# Patient Record
Sex: MALE
[De-identification: names, ages, dates, MRNs, and addresses within clinical notes are randomized per-mention and may not be internally consistent; named-entity substitution may affect disease eponyms.]

---

## 2019-10-15 ENCOUNTER — HOSPITAL ENCOUNTER (INPATIENT)
Dept: HOSPITAL 72 - EMR | Age: 44
LOS: 3 days | Discharge: HOME | DRG: 282 | End: 2019-10-18
Payer: COMMERCIAL

## 2019-10-15 VITALS — SYSTOLIC BLOOD PRESSURE: 120 MMHG | DIASTOLIC BLOOD PRESSURE: 68 MMHG

## 2019-10-15 VITALS — SYSTOLIC BLOOD PRESSURE: 122 MMHG | DIASTOLIC BLOOD PRESSURE: 75 MMHG

## 2019-10-15 VITALS — HEIGHT: 70 IN | WEIGHT: 169.04 LBS | BODY MASS INDEX: 24.2 KG/M2

## 2019-10-15 VITALS — DIASTOLIC BLOOD PRESSURE: 78 MMHG | SYSTOLIC BLOOD PRESSURE: 128 MMHG

## 2019-10-15 VITALS — DIASTOLIC BLOOD PRESSURE: 84 MMHG | SYSTOLIC BLOOD PRESSURE: 135 MMHG

## 2019-10-15 VITALS — DIASTOLIC BLOOD PRESSURE: 70 MMHG | SYSTOLIC BLOOD PRESSURE: 124 MMHG

## 2019-10-15 VITALS — SYSTOLIC BLOOD PRESSURE: 128 MMHG | DIASTOLIC BLOOD PRESSURE: 79 MMHG

## 2019-10-15 VITALS — SYSTOLIC BLOOD PRESSURE: 125 MMHG | DIASTOLIC BLOOD PRESSURE: 78 MMHG

## 2019-10-15 DIAGNOSIS — D73.2: ICD-10-CM

## 2019-10-15 DIAGNOSIS — D72.819: ICD-10-CM

## 2019-10-15 DIAGNOSIS — K76.6: ICD-10-CM

## 2019-10-15 DIAGNOSIS — I10: ICD-10-CM

## 2019-10-15 DIAGNOSIS — E11.9: ICD-10-CM

## 2019-10-15 DIAGNOSIS — F10.10: ICD-10-CM

## 2019-10-15 DIAGNOSIS — R16.1: ICD-10-CM

## 2019-10-15 DIAGNOSIS — K85.20: Primary | ICD-10-CM

## 2019-10-15 DIAGNOSIS — E87.6: ICD-10-CM

## 2019-10-15 DIAGNOSIS — D69.59: ICD-10-CM

## 2019-10-15 DIAGNOSIS — Z23: ICD-10-CM

## 2019-10-15 DIAGNOSIS — D50.9: ICD-10-CM

## 2019-10-15 DIAGNOSIS — K70.30: ICD-10-CM

## 2019-10-15 DIAGNOSIS — Z79.84: ICD-10-CM

## 2019-10-15 DIAGNOSIS — Z91.14: ICD-10-CM

## 2019-10-15 DIAGNOSIS — K70.10: ICD-10-CM

## 2019-10-15 DIAGNOSIS — D61.818: ICD-10-CM

## 2019-10-15 LAB
ADD MANUAL DIFF: YES
ALBUMIN SERPL-MCNC: 3.2 G/DL (ref 3.4–5)
ALBUMIN/GLOB SERPL: 0.6 {RATIO} (ref 1–2.7)
ALP SERPL-CCNC: 99 U/L (ref 46–116)
ALT SERPL-CCNC: 53 U/L (ref 12–78)
ANION GAP SERPL CALC-SCNC: 9 MMOL/L (ref 5–15)
APPEARANCE UR: CLEAR
APTT BLD: 28 SEC (ref 23–33)
APTT PPP: (no result) S
AST SERPL-CCNC: 66 U/L (ref 15–37)
BILIRUB SERPL-MCNC: 0.4 MG/DL (ref 0.2–1)
BUN SERPL-MCNC: 9 MG/DL (ref 7–18)
CALCIUM SERPL-MCNC: 8 MG/DL (ref 8.5–10.1)
CHLORIDE SERPL-SCNC: 109 MMOL/L (ref 98–107)
CK SERPL-CCNC: 141 U/L (ref 26–308)
CO2 SERPL-SCNC: 24 MMOL/L (ref 21–32)
CREAT SERPL-MCNC: 0.9 MG/DL (ref 0.55–1.3)
ERYTHROCYTE [DISTWIDTH] IN BLOOD BY AUTOMATED COUNT: 19.1 % (ref 11.6–14.8)
GLOBULIN SER-MCNC: 5.5 G/DL
GLUCOSE UR STRIP-MCNC: NEGATIVE MG/DL
HCT VFR BLD CALC: 30.2 % (ref 42–52)
HGB BLD-MCNC: 8.9 G/DL (ref 14.2–18)
INR PPP: 1.1 (ref 0.9–1.1)
KETONES UR QL STRIP: NEGATIVE
LEUKOCYTE ESTERASE UR QL STRIP: NEGATIVE
MCV RBC AUTO: 78 FL (ref 80–99)
NITRITE UR QL STRIP: NEGATIVE
PH UR STRIP: 5 [PH] (ref 4.5–8)
PLATELET # BLD: 83 K/UL (ref 150–450)
POTASSIUM SERPL-SCNC: 3.7 MMOL/L (ref 3.5–5.1)
PROT UR QL STRIP: (no result)
RBC # BLD AUTO: 3.89 M/UL (ref 4.7–6.1)
SODIUM SERPL-SCNC: 142 MMOL/L (ref 136–145)
SP GR UR STRIP: 1.01 (ref 1–1.03)
UROBILINOGEN UR-MCNC: NORMAL MG/DL (ref 0–1)
WBC # BLD AUTO: 5.3 K/UL (ref 4.8–10.8)

## 2019-10-15 PROCEDURE — 99285 EMERGENCY DEPT VISIT HI MDM: CPT

## 2019-10-15 PROCEDURE — 87340 HEPATITIS B SURFACE AG IA: CPT

## 2019-10-15 PROCEDURE — 82746 ASSAY OF FOLIC ACID SERUM: CPT

## 2019-10-15 PROCEDURE — 82728 ASSAY OF FERRITIN: CPT

## 2019-10-15 PROCEDURE — 36415 COLL VENOUS BLD VENIPUNCTURE: CPT

## 2019-10-15 PROCEDURE — 82550 ASSAY OF CK (CPK): CPT

## 2019-10-15 PROCEDURE — 83550 IRON BINDING TEST: CPT

## 2019-10-15 PROCEDURE — 86803 HEPATITIS C AB TEST: CPT

## 2019-10-15 PROCEDURE — 81001 URINALYSIS AUTO W/SCOPE: CPT

## 2019-10-15 PROCEDURE — 85007 BL SMEAR W/DIFF WBC COUNT: CPT

## 2019-10-15 PROCEDURE — 74177 CT ABD & PELVIS W/CONTRAST: CPT

## 2019-10-15 PROCEDURE — 83880 ASSAY OF NATRIURETIC PEPTIDE: CPT

## 2019-10-15 PROCEDURE — 90689 VACC IIV4 NO PRSRV 0.25ML IM: CPT

## 2019-10-15 PROCEDURE — 86900 BLOOD TYPING SEROLOGIC ABO: CPT

## 2019-10-15 PROCEDURE — 86850 RBC ANTIBODY SCREEN: CPT

## 2019-10-15 PROCEDURE — 72020 X-RAY EXAM OF SPINE 1 VIEW: CPT

## 2019-10-15 PROCEDURE — 85384 FIBRINOGEN ACTIVITY: CPT

## 2019-10-15 PROCEDURE — 84165 PROTEIN E-PHORESIS SERUM: CPT

## 2019-10-15 PROCEDURE — 84550 ASSAY OF BLOOD/URIC ACID: CPT

## 2019-10-15 PROCEDURE — 86901 BLOOD TYPING SEROLOGIC RH(D): CPT

## 2019-10-15 PROCEDURE — 86140 C-REACTIVE PROTEIN: CPT

## 2019-10-15 PROCEDURE — 82248 BILIRUBIN DIRECT: CPT

## 2019-10-15 PROCEDURE — 83036 HEMOGLOBIN GLYCOSYLATED A1C: CPT

## 2019-10-15 PROCEDURE — 82140 ASSAY OF AMMONIA: CPT

## 2019-10-15 PROCEDURE — 96374 THER/PROPH/DIAG INJ IV PUSH: CPT

## 2019-10-15 PROCEDURE — 85730 THROMBOPLASTIN TIME PARTIAL: CPT

## 2019-10-15 PROCEDURE — 83735 ASSAY OF MAGNESIUM: CPT

## 2019-10-15 PROCEDURE — 85610 PROTHROMBIN TIME: CPT

## 2019-10-15 PROCEDURE — 83540 ASSAY OF IRON: CPT

## 2019-10-15 PROCEDURE — 80053 COMPREHEN METABOLIC PANEL: CPT

## 2019-10-15 PROCEDURE — 85025 COMPLETE CBC W/AUTO DIFF WBC: CPT

## 2019-10-15 PROCEDURE — 84100 ASSAY OF PHOSPHORUS: CPT

## 2019-10-15 PROCEDURE — 96361 HYDRATE IV INFUSION ADD-ON: CPT

## 2019-10-15 PROCEDURE — 82962 GLUCOSE BLOOD TEST: CPT

## 2019-10-15 PROCEDURE — 83690 ASSAY OF LIPASE: CPT

## 2019-10-15 PROCEDURE — 86709 HEPATITIS A IGM ANTIBODY: CPT

## 2019-10-15 PROCEDURE — 82607 VITAMIN B-12: CPT

## 2019-10-15 PROCEDURE — 86703 HIV-1/HIV-2 1 RESULT ANTBDY: CPT

## 2019-10-15 PROCEDURE — 96375 TX/PRO/DX INJ NEW DRUG ADDON: CPT

## 2019-10-15 PROCEDURE — 82977 ASSAY OF GGT: CPT

## 2019-10-15 PROCEDURE — 86705 HEP B CORE ANTIBODY IGM: CPT

## 2019-10-15 PROCEDURE — 80307 DRUG TEST PRSMV CHEM ANLYZR: CPT

## 2019-10-15 RX ADMIN — SODIUM CHLORIDE AND POTASSIUM CHLORIDE SCH MLS/HR: 9; 1.49 INJECTION, SOLUTION INTRAVENOUS at 23:12

## 2019-10-15 RX ADMIN — FOLIC ACID SCH MLS/HR: 5 INJECTION, SOLUTION INTRAMUSCULAR; INTRAVENOUS; SUBCUTANEOUS at 22:29

## 2019-10-15 RX ADMIN — DEXTROSE AND SODIUM CHLORIDE SCH MLS/HR: 5; .45 INJECTION, SOLUTION INTRAVENOUS at 21:15

## 2019-10-15 NOTE — GENERAL PROGRESS NOTE
Assessment/Plan


Assessment/Plan:


GI CONSULT


Dictated.  Thank you.


Willie Riley MD





Subjective


Allergies:  


Coded Allergies:  


     No Known Allergies (Unverified , 10/15/19)





Objective





Last 24 Hour Vital Signs








  Date Time  Temp Pulse Resp B/P (MAP) Pulse Ox O2 Delivery O2 Flow Rate FiO2


 


10/15/19 18:58 98.0 98 18 131/84 95 Room Air  


 


10/15/19 18:01 98.2 95 18 128/79 95 Room Air  


 


10/15/19 16:54 97.6 98 15 135/84 95 Room Air  


 


10/15/19 16:02 98.0       


 


10/15/19 15:15 98.2 100 18 128/78 95 Room Air  


 


10/15/19 14:50 98.8 95 16 120/68 95 Room Air  


 


10/15/19 13:00 98.2 100 20 122/75 95 Room Air  


 


10/15/19 12:02 98.4 101 18 125/78 95 Room Air  


 


10/15/19 12:02 98.4 111 18 125/78 (94) 95 Room Air  








Laboratory Tests


10/15/19 12:30: 


White Blood Count 5.3, Red Blood Count 3.89L, Hemoglobin 8.9L, Hematocrit 30.2L

, Mean Corpuscular Volume 78L, Mean Corpuscular Hemoglobin 22.9L, Mean 

Corpuscular Hemoglobin Concent 29.5L, Red Cell Distribution Width 19.1H, 

Platelet Count 83L, Mean Platelet Volume 8.9, Neutrophils (%) (Auto) , 

Lymphocytes (%) (Auto) , Monocytes (%) (Auto) , Eosinophils (%) (Auto) , 

Basophils (%) (Auto) , Differential Total Cells Counted 100, Neutrophils % (

Manual) 45, Lymphocytes % (Manual) 48H, Monocytes % (Manual) 4, Eosinophils % (

Manual) 3, Basophils % (Manual) 0, Band Neutrophils 0, Platelet Estimate 

DecreasedL, Platelet Morphology , Clumped Platelets 1+, Hypochromasia 1+, 

Anisocytosis 1+, Microcytosis 1+, Prothrombin Time 11.2, Prothromb Time 

International Ratio 1.1, Activated Partial Thromboplast Time 28, Urine Color 

Pale yellow, Urine Appearance Clear, Urine pH 5, Urine Specific Gravity 1.015, 

Urine Protein 2+H, Urine Glucose (UA) Negative, Urine Ketones Negative, Urine 

Blood 1+H, Urine Nitrite Negative, Urine Bilirubin Negative, Urine Urobilinogen 

Normal, Urine Leukocyte Esterase Negative, Urine RBC 0-2H, Urine WBC 0, Urine 

Squamous Epithelial Cells Occasional, Urine Bacteria Occasional, Sodium Level 

142, Potassium Level 3.7, Chloride Level 109H, Carbon Dioxide Level 24, Anion 

Gap 9, Blood Urea Nitrogen 9, Creatinine 0.9, Estimat Glomerular Filtration 

Rate > 60, Glucose Level 119H, Calcium Level 8.0L, Total Bilirubin 0.4, 

Aspartate Amino Transf (AST/SGOT) 66H, Alanine Aminotransferase (ALT/SGPT) 53, 

Alkaline Phosphatase 99, Total Creatine Kinase 141, Total Protein 8.7H, Albumin 

3.2L, Globulin 5.5, Albumin/Globulin Ratio 0.6L, Lipase 623H


Height (Feet):  5


Height (Inches):  10.00


Weight (Pounds):  170











Willie Riley MD Oct 15, 2019 20:26

## 2019-10-15 NOTE — EMERGENCY ROOM REPORT
History of Present Illness


General


Chief Complaint:  Back Pain-No Injury





Present Illness


HPI


44-year-old male with a history of type 2 diabetes and hypertension currently 

not controlled as patient does not take his medication on daily basis, brought 

in by paramedics after being on the street and complaining of severe low back 

pain as well as bilateral leg numbness.  Patient reports that he has been 

feeling that his legs are giving up on him and feeling numbness for several 

months.  Patient supposed to be taking metformin for an unknown blood pressure 

medication who reports noncompliant with.  Also reports that he drinks alcohol 

on a daily basis.  Patient denies any fall or injury, denies urinary frequency 

and pain however reports of blood in his urine.  Denies flank pain, saddle 

paresthesia, tingling and numbness radiating from lower back to the legs.  

Patient rating the pain in his lumbar region and 7 out of 10 and left-sided 

ribs 5 out of 10 without shortness of breath.  Denies chest pain, blurry vision

, headache, dizziness, abdominal pain, nausea vomiting.  Has not taken 

medication to alleviate his symptoms.  Patient is stable and has stable vital 

signs.  As of trauma noted.


 (Maximilian Nolan)


Allergies:  


Coded Allergies:  


     No Known Allergies (Unverified , 10/15/19)





Patient History


Past Medical History:  see triage record


Past Surgical History:  unable to obtain


Pertinent Family History:  none


Social History:  Reports: alcohol use - daily


Immunizations:  UTD


Reviewed Nursing Documentation:  PMH: Agreed; PSxH: Agreed (Maximilian Nolan)





Nursing Documentation-PMH


Hx Cardiac Problems:  No - anemia


Hx Hypertension:  Yes


Hx Diabetes:  Yes


 (Maximilian Nolan)





Review of Systems


All Other Systems:  negative except mentioned in HPI


 (Maximilian Nolan)





Physical Exam





Vital Signs








  Date Time  Temp Pulse Resp B/P (MAP) Pulse Ox O2 Delivery O2 Flow Rate FiO2


 


10/15/19 12:02 98.4 111 18 125/78 (94) 95 Room Air  








Sp02 EP Interpretation:  reviewed, normal


General Appearance:  no apparent distress, alert, GCS 15, non-toxic


Head:  normocephalic, atraumatic


Eyes:  bilateral eye normal inspection, bilateral eye PERRL


ENT:  hearing grossly normal, normal pharynx, no angioedema, normal voice


Neck:  full range of motion, supple/symm/no masses


Respiratory:  chest non-tender, lungs clear, normal breath sounds, no rhonchi, 

no respiratory distress, no wheezing, speaking full sentences


Cardiovascular #1:  normal inspection, normal peripheral pulses, regular rate, 

rhythm, no murmur, normal capillary refill


Cardiovascular #2:  2+ dorsalis pedis (R), 2+ dorsalis pedis (L)


Gastrointestinal:  normal bowel sounds, non tender, soft, non-distended, no 

guarding, no rebound


Rectal:  deferred


Musculoskeletal:  back normal, gait/station normal, normal range of motion, non-

tender, no calf tenderness, pelvis stable


Neurologic:  alert, oriented x3, responsive, motor strength/tone normal, 

sensory intact, speech normal


Psychiatric:  judgement/insight normal, memory normal, mood/affect normal, no 

suicidal/homicidal ideation


Skin:  no rash, normal color, other - No ecchymosis noted


Lymphatic:  no adenopathy


 (Maximilian Nolan)





Medical Decision Making


PA Attestation


All my diagnosis and treatment plans were reviewed ad discussed with my 

supervising physician Dr. Villarreal


 (Maximilian Nolan)


PA Attestation


I have overseen and manage the care of this patient along with AN Roman.  Briefly, this is a 44-year-old male with a history of 

noncompliance for hypertension and diabetes presenting today with back pain and 

abdominal pain.  He was found to have pancreatitis from an elevated lipase but 

also findings on CT suggestive of cirrhosis which he says is a new diagnosis 

with him, portal hypertension, splenomegaly.  He has microcytic anemia and 

thrombocytopenia concerning for splenic sequestration as well as a granuloma in 

the right middle lobe.  He has persistent pain and is receiving IV fluids and 

pain medication.  He will be admitted for further work-up and management.


 (Srinath Martinez MD)


Diagnostic Impression:  


 Primary Impression:  


 Spleen enlargement


 Additional Impressions:  


 Liver cirrhosis


 Pancreatitis


ER Course


44-year-old male with a history of type 2 diabetes and hypertension currently 

not controlled as patient does not take his medication on daily basis, brought 

in by paramedics after being on the street and complaining of severe low back 

pain as well as bilateral leg numbness.  Patient reports that he has been 

feeling that his legs are giving up on him and feeling numbness for several 

months.  Patient supposed to be taking metformin for an unknown blood pressure 

medication who reports noncompliant with.  Also reports that he drinks alcohol 

on a daily basis.  Patient denies any fall or injury, denies urinary frequency 

and pain however reports of blood in his urine.  Denies flank pain, saddle 

paresthesia, tingling and numbness radiating from lower back to the legs.  

Patient rating the pain in his lumbar region and 7 out of 10 and left-sided 

ribs 5 out of 10 without shortness of breath.  Denies chest pain, blurry vision

, headache, dizziness, abdominal pain, nausea vomiting.  Has not taken 

medication to alleviate his symptoms.  Patient is stable and has stable vital 

signs.  As of trauma noted.





Ddx considered but are not limited to: Splenomegaly, pink otitis, liver 

cirrhosis, cholecystitis, lumbar spine fracture versus contusion











Vital signs: are WNL, pt. is afebrile








 H&PE are most consistent with: Splenomegaly, pancreatitis, liver cirrhosis














ORDERS: Lumbar spine x-ray, chest x-ray with rib series on the left side, 

abdominal CT scan with contrast, abdominal pain order





ER intervention: Morphine, NS bolus, Zofran, Pepcid








Patient was admitted with diagnosis of    splenomegaly, liver cirrhosis, 

hepatitis   to         under supervision of .: Sheila








pt stable at time of admission 


 (Maximilian Nolan)





Chest X-Ray Diagnostic Results


Chest X-Ray Diagnostic Results :  


   Chest X-Ray Ordered:  Yes


   # of Views/Limited/Complete:  1 View


   Indication:  Other


   EP Interpretation:  Yes


   PA Xray:  Interpretation reviewed, by supervising MD, and agrees with 

findings.


   Interpretation:  no consolidation, no effusion, no pneumothorax


   Impression:  No acute disease


   Electronically Signed by:  Maximilian Herrera PA-C


 (Maximilian Nolan)





Other X-Ray Diagnostic Results


Other X-Ray Diagnostic Results #1:  


   X-Ray ordered:  L spine


   # of Views/Limited Vs Complete:  3 View


   Indication:  Pain


   EP Interpretation:  Yes


   PA Xray:  Interpretation reviewed, by supervising MD, and agrees with 

findings.


   Interpretation:  no dislocation, no soft tissue swelling, no fractures


   Impression:  No acute disease


   Electronically Signed by:  Maximilian Herrera PA-C


Other X-Ray Diagnostic Results #2:  


   X-Ray ordered:  ribs left side


   # of Views/Limited Vs Complete:  3 View


   Indication:  Pain


   EP Interpretation:  Yes


   PA Xray:  Interpretation reviewed, by supervising MD, and agrees with 

findings.


   Interpretation:  no dislocation, no soft tissue swelling, no fractures


   Impression:  No acute disease


   Electronically Signed by:  Maximilian Herrera PA-C


 (Maximilian Nolan)





CT/MRI/US Diagnostic Results


CT/MRI/US Diagnostic Results :  


   Imaging Test Ordered:  CT abdomen pelvis with contrast


   Impression


Enlarged spleen, liver cirrhosis


 (Maximilian Nolan)





Last Vital Signs








  Date Time  Temp Pulse Resp B/P (MAP) Pulse Ox O2 Delivery O2 Flow Rate FiO2


 


10/15/19 12:02 98.4 101 18 125/78 95 Room Air  








 (Maximilian Nolan)


Disposition:  ADMITTED AS INPATIENT


Condition:  Serious











Maximilian Nolan Oct 15, 2019 13:06


Srinath Martinez MD Oct 15, 2019 17:57

## 2019-10-15 NOTE — DIAGNOSTIC IMAGING REPORT
Indication: Lumbar spine pain

 

Technique: 3 views of the lumbar spine

 

Comparison: None

 

Findings: Bony alignment is normal. Vertebral body heights are preserved. There is

degenerative disc narrowing at L3-4 and L4-5. The remaining disc spaces are

preserved. No acute fractures. No dislocations. The pedicles are intact. Sacral

arches are preserved. Sacroiliac joint spaces are preserved. The surrounding soft

tissues are unremarkable

 

Impression: Degenerative changes as described

 

No acute bony trauma

## 2019-10-15 NOTE — NUR
ED Nurse Note:



pt went down for x-ray by  in stable condition. pt was able to ambulate from 
the bed to .

## 2019-10-15 NOTE — NUR
ED Nurse Note:



pt brought in to ER from street by ambulance due to severe back pain 10/10. per 
pt, he has had chronic back pain but today the pain got unbearable. pt lives 
with parents. pt aao x4 and unable to ambulate at this moment due to severe 
pain. calm and cooperative. skin dry but no visible wound noted at this time. 
no acute distress noted at this time.

## 2019-10-15 NOTE — DIAGNOSTIC IMAGING REPORT
Clinical Indication: Severe low back pain, abdominal pain

 

Technique:   No oral contrast utilized, per emergency room physician request  IV

administration nonionic contrast. Venous phase spiral acquisition obtained through

the abdomen and pelvis. Multiplanar reconstructions were generated. Total dose length

product 4327 mGycm. CTDIvol(s) 49 x 2 mGy. Dose reduction achieved using automated

exposure control

 

 

Comparison: none

 

Findings: Lack of enteric contrast limits assessment of the GI tract. The appendix is

normal. There is no evidence of diverticulosis or diverticulitis. The distal ileum is

somewhat prominent in caliber, but no charlene small bowel distention. No free or

loculated intraperitoneal gas or fluid is evident. The distal esophagus, stomach,

duodenum are unremarkable.

 

The liver demonstrates surface nodularity, consistent with cirrhosis. Subcentimeter

low-attenuation lesions are seen in the inferior right hepatic lobe, segment 6. The

gallbladder is nondistended, unremarkable. No biliary ductal dilatation. The pancreas

is unremarkable. The spleen is enlarged, measuring 15 cm long axis dimension. No

focal splenic abnormality. Small perigastric, periesophageal, and splenic hilar

varices are demonstrated. The adrenals are unremarkable. The kidneys are

unremarkable. No renal or ureteral calculi. No focal renal parenchymal abnormality.

No hydronephrosis or hydroureter. There is mild bladder wall thickening. No pelvic

mass or adenopathy. The prostate is prominent.

 

The included lung bases demonstrate posterior dependent atelectatic changes, are

otherwise clear. The bones demonstrate mild degenerative spondylosis changes.

 

Impression: Limited assessment of the GI tract, due to lack of enteric contrast

administration

 

Mildly prominent distal ileum, doubtful significance although the possibility of

enteritis should be considered

 

Mild bladder wall thickening, could indicate cystitis

 

No definite acute abnormality otherwise

 

Evidence of hepatic cirrhosis, with hepatic surface nodularity

 

Evidence of portal hypertension, with splenomegaly and small varices

 

Prominent prostate

 

Incidental findings as noted, including dependent posterior pulmonary atelectatic

changes, mild degenerative spondylosis

 

The CT scanner at [facility] is accredited by the [employer]

[employer] and the scans are performed using protocols designed to limit radiation

exposure to as low as reasonably achievable to attain images of sufficient resolution

adequate for diagnostic evaluation.

## 2019-10-15 NOTE — NUR
TRANSFER TO FLOOR:

Patient transferred to  as ordered, per MD Gayle.   Report given to RADHA Sands.  Belongings and medications given to patient. Family and or S/O informed of 
transfer.

## 2019-10-15 NOTE — NUR
NURSE NOTES:

Admitted a 44 year old, alert and oriented x4, with complaint of 5/10. IV access on the 
right antecubital area. Obtained admission orders from Dr. Araujo. Pt was seen by Dr. Riley, ordered to put patient on clear liquids for now. Oriented to room. Instructed the 
use of call light. Call light in reach, bed in lowest. Will continue to monitor.

## 2019-10-15 NOTE — DIAGNOSTIC IMAGING REPORT
Indication: Reason For Exam: PAIN

 

Technique: One view of the chest, 2 views of the left ribs

 

Comparison: none

 

Findings: Chest demonstrates clear lungs, no infiltrates, effusions, or congestion.

The heart size is upper limits of normal. No evidence of acute rib fracture. No

pneumothorax. There is a calcified granuloma in the right midlung

 

Impression: No acute process

 

Evidence of old granulomatous disease

## 2019-10-16 VITALS — DIASTOLIC BLOOD PRESSURE: 77 MMHG | SYSTOLIC BLOOD PRESSURE: 140 MMHG

## 2019-10-16 VITALS — SYSTOLIC BLOOD PRESSURE: 135 MMHG | DIASTOLIC BLOOD PRESSURE: 80 MMHG

## 2019-10-16 VITALS — DIASTOLIC BLOOD PRESSURE: 69 MMHG | SYSTOLIC BLOOD PRESSURE: 121 MMHG

## 2019-10-16 VITALS — SYSTOLIC BLOOD PRESSURE: 127 MMHG | DIASTOLIC BLOOD PRESSURE: 76 MMHG

## 2019-10-16 VITALS — DIASTOLIC BLOOD PRESSURE: 90 MMHG | SYSTOLIC BLOOD PRESSURE: 122 MMHG

## 2019-10-16 VITALS — DIASTOLIC BLOOD PRESSURE: 81 MMHG | SYSTOLIC BLOOD PRESSURE: 144 MMHG

## 2019-10-16 LAB
ADD MANUAL DIFF: YES
ALBUMIN SERPL-MCNC: 3.1 G/DL (ref 3.4–5)
ALBUMIN/GLOB SERPL: 0.6 {RATIO} (ref 1–2.7)
ALP SERPL-CCNC: 99 U/L (ref 46–116)
ALT SERPL-CCNC: 45 U/L (ref 12–78)
ANION GAP SERPL CALC-SCNC: 6 MMOL/L (ref 5–15)
AST SERPL-CCNC: 54 U/L (ref 15–37)
BILIRUB SERPL-MCNC: 0.6 MG/DL (ref 0.2–1)
BUN SERPL-MCNC: 7 MG/DL (ref 7–18)
CALCIUM SERPL-MCNC: 8.2 MG/DL (ref 8.5–10.1)
CHLORIDE SERPL-SCNC: 104 MMOL/L (ref 98–107)
CO2 SERPL-SCNC: 28 MMOL/L (ref 21–32)
CREAT SERPL-MCNC: 0.7 MG/DL (ref 0.55–1.3)
ERYTHROCYTE [DISTWIDTH] IN BLOOD BY AUTOMATED COUNT: 19 % (ref 11.6–14.8)
GLOBULIN SER-MCNC: 5.4 G/DL
HCT VFR BLD CALC: 27.7 % (ref 42–52)
HGB BLD-MCNC: 8.3 G/DL (ref 14.2–18)
INR PPP: 1.1 (ref 0.9–1.1)
MCV RBC AUTO: 76 FL (ref 80–99)
PLATELET # BLD: 26 K/UL (ref 150–450)
POTASSIUM SERPL-SCNC: 3.3 MMOL/L (ref 3.5–5.1)
RBC # BLD AUTO: 3.63 M/UL (ref 4.7–6.1)
SODIUM SERPL-SCNC: 138 MMOL/L (ref 136–145)
WBC # BLD AUTO: 3.7 K/UL (ref 4.8–10.8)

## 2019-10-16 PROCEDURE — 30233R1 TRANSFUSION OF NONAUTOLOGOUS PLATELETS INTO PERIPHERAL VEIN, PERCUTANEOUS APPROACH: ICD-10-PCS

## 2019-10-16 RX ADMIN — DEXTROSE AND SODIUM CHLORIDE SCH MLS/HR: 5; .45 INJECTION, SOLUTION INTRAVENOUS at 07:15

## 2019-10-16 RX ADMIN — FOLIC ACID SCH MLS/HR: 5 INJECTION, SOLUTION INTRAMUSCULAR; INTRAVENOUS; SUBCUTANEOUS at 23:37

## 2019-10-16 RX ADMIN — FOLIC ACID SCH MLS/HR: 5 INJECTION, SOLUTION INTRAMUSCULAR; INTRAVENOUS; SUBCUTANEOUS at 23:19

## 2019-10-16 RX ADMIN — SODIUM CHLORIDE AND POTASSIUM CHLORIDE SCH MLS/HR: 9; 1.49 INJECTION, SOLUTION INTRAVENOUS at 23:11

## 2019-10-16 RX ADMIN — DEXTROSE MONOHYDRATE, SODIUM CHLORIDE, AND POTASSIUM CHLORIDE SCH MLS/HR: 50; 9; 2.98 INJECTION, SOLUTION INTRAVENOUS at 19:02

## 2019-10-16 RX ADMIN — SODIUM CHLORIDE AND POTASSIUM CHLORIDE SCH MLS/HR: 9; 1.49 INJECTION, SOLUTION INTRAVENOUS at 23:37

## 2019-10-16 NOTE — NUR
NURSE NOTES:

RECEIVED PT FROM RADHA LOPES. PT IS AWAKE, AAO X4, ON ROOM AIR, NO ACUTE DISTRESS NOTED. IV 
ON LEFT AC IS INTACT AND PATENT. PT DENIES PAIN AT THE MOMENT. BED IS LOCKED AND LOW, BED 
ALARMS ACTIVE, SIDE RAILS UP X2 AND CALL LIGHT IS WITHIN REACH. WILL CONTINUE TO MONITOR.

## 2019-10-16 NOTE — NUR
NURSE NOTES:

Pt signed the platelet transfusion consent form. Platelet transfusion information given to 
the pt. Pt is aware of possible adverse reactions. Given Tylenol and Benadryl x 1 prior to 
platelet transfusion as per Dr. Bryant. VS obtained. /80 MD 67 O2 Sat 99% RR 16 
Temp 98.1. Started platelet, 2 RNs verified it. RN at bedside will continue to monitor.

## 2019-10-16 NOTE — NUR
NURSE NOTES:

Patient is tolerated with his regular diet, denies nausea or vomiting, no pain. Patient is 
aware of urine collection for drug screen.

## 2019-10-16 NOTE — NUR
NURSE NOTES:

RECEIVED PT FROM RADHA GALLEGOS. PT IS AWAKE, AAO X4, ON ROOM AIR, NO ACUTE DISTRESS NOTED. IV ON 
LEFT AC IS INTACT AND PATENT. PT DENIES PAIN AND SOB. BED IS LOCKED AND LOW, BED ALARMS 
ACTIVE, SIDE RAILS UP X2 AND CALL LIGHT IS WITHIN REACH. WILL CONTINUE TO MONITOR

## 2019-10-16 NOTE — NUR
NURSE NOTES:

RN spoke to Dr. Bryant regarding patient's WBC of 3.7  and PLT of 26,000 today dropped 
from 83,000 since 10/15/19. No episodes of active bleeding now but patient stated that he 
had dar colored urine before. RN received order from Dr. Bryant to check INR and 
Fibrinogen and give 1 unit of platelet, [facility] made aware and RN was told platelet will be 
ordered to [facility] so it will take 4-5 hours to receive  and patient agreed with 
platelet transfusion. Transfusion information given to the patient.

## 2019-10-16 NOTE — NUR
NURSE NOTES:

Patient finished platelet transfusion without adverse reaction. v/s obtained BP- 132/83, 
pulse is 74 and temp is 99.0. O2 sat is 98%, denies any back pain or SOB. Endorse to next 
shift to continue to monitor. RN returned paper and empty bag to [facility]. Urine specimen was 
collected and sent it to lab.

## 2019-10-16 NOTE — GENERAL PROGRESS NOTE
Assessment/Plan


Assessment/Plan:


Assessment


- alcohol abuse


- alcoholic liver disease


- alcoholic pancreatitis


- Anemia / thrombocytopenia





Recommendations


- d/c EtOH


- follow labs


- po solids 


- outpatient colonoscopy advised to pt, once improved





Subjective


Allergies:  


Coded Allergies:  


     No Known Allergies (Unverified , 10/15/19)


Subjective


Better


tolerating PO


no abd pain





Objective





Last 24 Hour Vital Signs








  Date Time  Temp Pulse Resp B/P (MAP) Pulse Ox O2 Delivery O2 Flow Rate FiO2


 


10/16/19 20:00 98.4 70 16 127/76 (93) 97   


 


10/16/19 16:00 98.5 71 18 122/90 (101) 97   


 


10/16/19 12:00 98.4 80 20 135/80 (98) 97   


 


10/16/19 09:00      Room Air  


 


10/16/19 08:00 98.6 78 18 140/77 (98) 96   


 


10/16/19 04:00 98.1 73 17 144/81 (102) 98   


 


10/16/19 00:00 98.6 90 18 121/69 (86) 95   


 


10/15/19 23:09      Room Air  

















Intake and Output  


 


 10/15/19 10/16/19





 19:00 07:00


 


Intake Total 3000 ml 986 ml


 


Balance 3000 ml 986 ml


 


  


 


Intake Oral 0 ml 480 ml


 


IV Total 3000 ml 506 ml


 


# Voids  3








Laboratory Tests


10/16/19 05:53: 


White Blood Count 3.7L, Red Blood Count 3.63L, Hemoglobin 8.3L, Hematocrit 27.7L

, Mean Corpuscular Volume 76L, Mean Corpuscular Hemoglobin 23.0L, Mean 

Corpuscular Hemoglobin Concent 30.1L, Red Cell Distribution Width 19.0H, 

Platelet Count 26#L, Mean Platelet Volume 8.5, Neutrophils (%) (Auto) , 

Lymphocytes (%) (Auto) , Monocytes (%) (Auto) , Eosinophils (%) (Auto) , 

Basophils (%) (Auto) , Differential Total Cells Counted 100, Neutrophils % (

Manual) 54, Lymphocytes % (Manual) 34, Monocytes % (Manual) 10, Eosinophils % (

Manual) 2, Basophils % (Manual) 0, Band Neutrophils 0, Platelet Estimate 

DecreasedL, Platelet Morphology Normal, Hypochromasia 2+, Anisocytosis 2+, 

Microcytosis 1+, Sodium Level 138, Potassium Level 3.3L, Chloride Level 104, 

Carbon Dioxide Level 28, Anion Gap 6, Blood Urea Nitrogen 7, Creatinine 0.7, 

Estimat Glomerular Filtration Rate > 60, Glucose Level 86, Calcium Level 8.2L, 

Total Bilirubin 0.6, Aspartate Amino Transf (AST/SGOT) 54H, Alanine 

Aminotransferase (ALT/SGPT) 45, Alkaline Phosphatase 99, Total Protein 8.5H, 

Total Protein (PEP) [Pending], Albumin 3.1L, Albumin (PEP) [Pending], Globulin 

5.4, Globulin (PEP) [Pending], Albumin/Globulin Ratio [Pending], Alpha-1-

Globulins [Pending], Alpha-2-Globulins [Pending], Beta Globulins [Pending], 

Beta Gamma Globulin [Pending], PEP Abnormal Protein Bands [Pending], Protein 

Electrophoresis Interpret [Pending], Lipase 552H, Hepatitis A IgM Antibody [

Pending], Hepatitis B Surface Antigen [Pending], Hepatitis B Core IgM Antibody [

Pending], Hepatitis C Antibody [Pending], HIV (1&2) Antibody Rapid Negative


10/16/19 09:00: 


Prothrombin Time 11.6H, Prothromb Time International Ratio 1.1, Fibrinogen 213


10/16/19 19:10: 


Urine Opiates Screen Negative, Urine Barbiturates Screen Negative, 

Phencyclidine (PCP) Screen Negative, Urine Amphetamines Screen Negative, Urine 

Benzodiazepines Screen Negative, Urine Cocaine Screen Negative, Urine Marijuana 

(THC) Screen Negative


Height (Feet):  5


Height (Inches):  10.00


Weight (Pounds):  169


Objective


WDWN


NCAT


supple


CTA


RR


Abd soft NT ND


no edema


non focal











Willie Riley MD Oct 16, 2019 21:28

## 2019-10-16 NOTE — NUR
NURSE NOTES:

rechecked v/s BP- 132/75, pulse 67, temp 98.1 and o2sat is 98%, no adverse reaction @ this 
time. Will continue to monitor.

## 2019-10-16 NOTE — CONSULTATION
History of Present Illness


General


Chief Complaint:  Back Pain-No Injury





Present Illness


Allergies:  


Coded Allergies:  


     No Known Allergies (Unverified , 10/15/19)





Medication History


Miscellaneous Medications


Metformin Hcl (Metformin Hcl), Unknown Dose MC, (Reported)





Patient History


Healthcare decision maker





Resuscitation status


Full Code


Advanced Directive on File








Physical Exam





Last 24 Hour Vital Signs








  Date Time  Temp Pulse Resp B/P (MAP) Pulse Ox O2 Delivery O2 Flow Rate FiO2


 


10/16/19 04:00 98.1 73 17 144/81 (102) 98   


 


10/16/19 00:00 98.6 90 18 121/69 (86) 95   


 


10/15/19 23:09      Room Air  


 


10/15/19 20:00 98.5 92 19 124/70 (88) 95   


 


10/15/19 18:58 98.0 98 18 131/84 95 Room Air  


 


10/15/19 18:01 98.2 95 18 128/79 95 Room Air  


 


10/15/19 16:54 97.6 98 15 135/84 95 Room Air  


 


10/15/19 16:02 98.0       


 


10/15/19 15:15 98.2 100 18 128/78 95 Room Air  


 


10/15/19 14:50 98.8 95 16 120/68 95 Room Air  


 


10/15/19 13:00 98.2 100 20 122/75 95 Room Air  


 


10/15/19 12:02 98.4 101 18 125/78 95 Room Air  


 


10/15/19 12:02 98.4 111 18 125/78 (94) 95 Room Air  

















Intake and Output  


 


 10/15/19 10/16/19





 18:59 06:59


 


Intake Total 3000 ml 911 ml


 


Balance 3000 ml 911 ml


 


  


 


Intake Oral 0 ml 480 ml


 


IV Total 3000 ml 431 ml


 


# Voids  3











Laboratory Tests








Test


  10/15/19


12:30 10/16/19


05:53 10/16/19


09:00


 


White Blood Count


  5.3 K/UL


(4.8-10.8) 3.7 K/UL


(4.8-10.8)  L 


 


 


Red Blood Count


  3.89 M/UL


(4.70-6.10)  L 3.63 M/UL


(4.70-6.10)  L 


 


 


Hemoglobin


  8.9 G/DL


(14.2-18.0)  L 8.3 G/DL


(14.2-18.0)  L 


 


 


Hematocrit


  30.2 %


(42.0-52.0)  L 27.7 %


(42.0-52.0)  L 


 


 


Mean Corpuscular Volume


  78 FL (80-99)


L 76 FL (80-99)


L 


 


 


Mean Corpuscular Hemoglobin


  22.9 PG


(27.0-31.0)  L 23.0 PG


(27.0-31.0)  L 


 


 


Mean Corpuscular Hemoglobin


Concent 29.5 G/DL


(32.0-36.0)  L 30.1 G/DL


(32.0-36.0)  L 


 


 


Red Cell Distribution Width


  19.1 %


(11.6-14.8)  H 19.0 %


(11.6-14.8)  H 


 


 


Platelet Count


  83 K/UL


(150-450)  L 26 K/UL


(150-450)  #L 


 


 


Mean Platelet Volume


  8.9 FL


(6.5-10.1) 8.5 FL


(6.5-10.1) 


 


 


Neutrophils (%) (Auto)


  % (45.0-75.0)


  % (45.0-75.0)


  


 


 


Lymphocytes (%) (Auto)


  % (20.0-45.0)


  % (20.0-45.0)


  


 


 


Monocytes (%) (Auto)  % (1.0-10.0)    % (1.0-10.0)   


 


Eosinophils (%) (Auto)  % (0.0-3.0)    % (0.0-3.0)   


 


Basophils (%) (Auto)  % (0.0-2.0)    % (0.0-2.0)   


 


Differential Total Cells


Counted 100  


  


  


 


 


Neutrophils % (Manual) 45 % (45-75)   Pending   


 


Lymphocytes % (Manual) 48 % (20-45)  H Pending   


 


Monocytes % (Manual) 4 % (1-10)    


 


Eosinophils % (Manual) 3 % (0-3)    


 


Basophils % (Manual) 0 % (0-2)    


 


Band Neutrophils 0 % (0-8)    


 


Platelet Estimate Decreased  L Pending   


 


Platelet Morphology    Pending   


 


Clumped Platelets 1+    


 


Hypochromasia 1+    


 


Anisocytosis 1+    


 


Microcytosis 1+    


 


Prothrombin Time


  11.2 SEC


(9.30-11.50) 


  11.6 SEC


(9.30-11.50)  H


 


Prothromb Time International


Ratio 1.1 (0.9-1.1)  


  


  1.1 (0.9-1.1)  


 


 


Activated Partial


Thromboplast Time 28 SEC (23-33)


  


  


 


 


Urine Color Pale yellow    


 


Urine Appearance Clear    


 


Urine pH 5 (4.5-8.0)    


 


Urine Specific Gravity


  1.015


(1.005-1.035) 


  


 


 


Urine Protein


  2+ (NEGATIVE)


H 


  


 


 


Urine Glucose (UA)


  Negative


(NEGATIVE) 


  


 


 


Urine Ketones


  Negative


(NEGATIVE) 


  


 


 


Urine Blood


  1+ (NEGATIVE)


H 


  


 


 


Urine Nitrite


  Negative


(NEGATIVE) 


  


 


 


Urine Bilirubin


  Negative


(NEGATIVE) 


  


 


 


Urine Urobilinogen


  Normal MG/DL


(0.0-1.0) 


  


 


 


Urine Leukocyte Esterase


  Negative


(NEGATIVE) 


  


 


 


Urine RBC


  0-2 /HPF (0 -


0)  H 


  


 


 


Urine WBC


  0 /HPF (0 - 0)


  


  


 


 


Urine Squamous Epithelial


Cells Occasional


/LPF 


  


 


 


Urine Bacteria


  Occasional


/HPF (NONE) 


  


 


 


Sodium Level


  142 MMOL/L


(136-145) 138 MMOL/L


(136-145) 


 


 


Potassium Level


  3.7 MMOL/L


(3.5-5.1) 3.3 MMOL/L


(3.5-5.1)  L 


 


 


Chloride Level


  109 MMOL/L


()  H 104 MMOL/L


() 


 


 


Carbon Dioxide Level


  24 MMOL/L


(21-32) 28 MMOL/L


(21-32) 


 


 


Anion Gap


  9 mmol/L


(5-15) 6 mmol/L


(5-15) 


 


 


Blood Urea Nitrogen


  9 mg/dL (7-18)


  7 mg/dL (7-18)


  


 


 


Creatinine


  0.9 MG/DL


(0.55-1.30) 0.7 MG/DL


(0.55-1.30) 


 


 


Estimat Glomerular Filtration


Rate > 60 mL/min


(>60) > 60 mL/min


(>60) 


 


 


Glucose Level


  119 MG/DL


()  H 86 MG/DL


() 


 


 


Calcium Level


  8.0 MG/DL


(8.5-10.1)  L 8.2 MG/DL


(8.5-10.1)  L 


 


 


Total Bilirubin


  0.4 MG/DL


(0.2-1.0) 0.6 MG/DL


(0.2-1.0) 


 


 


Aspartate Amino Transf


(AST/SGOT) 66 U/L (15-37)


H 54 U/L (15-37)


H 


 


 


Alanine Aminotransferase


(ALT/SGPT) 53 U/L (12-78)


  45 U/L (12-78)


  


 


 


Alkaline Phosphatase


  99 U/L


() 99 U/L


() 


 


 


Total Creatine Kinase


  141 U/L


() 


  


 


 


Total Protein


  8.7 G/DL


(6.4-8.2)  H 8.5 G/DL


(6.4-8.2)  H 


 


 


Albumin


  3.2 G/DL


(3.4-5.0)  L 3.1 G/DL


(3.4-5.0)  L 


 


 


Globulin 5.5 g/dL   5.4 g/dL   


 


Albumin/Globulin Ratio


  0.6 (1.0-2.7)


L 0.6 (1.0-2.7)


L 


 


 


Lipase


  623 U/L


()  H 552 U/L


()  H 


 


 


Hepatitis A IgM Antibody  Pending   


 


Hepatitis B Surface Antigen  Pending   


 


Hepatitis B Core IgM Antibody  Pending   


 


Hepatitis C Antibody  Pending   


 


Fibrinogen


  


  


  213 mg/dL


(200-400)








Height (Feet):  5


Height (Inches):  10.00


Weight (Pounds):  169


Medications





Current Medications








 Medications


  (Trade)  Dose


 Ordered  Sig/Reinaldo


 Route


 PRN Reason  Start Time


 Stop Time Status Last Admin


Dose Admin


 


 Clonidine HCl


  (Catapres Tab)  0.1 mg  Q6H  PRN


 ORAL


  SBP>170  10/15/19 21:15


 11/14/19 21:14   


 


 


 Dextrose/Sodium


 Chloride  1,000 ml @ 


 100 mls/hr  Q10H


 IV


   10/15/19 21:15


 11/14/19 21:14   


 


 


 Folic Acid 1 mg/


 Multivitamins 10


 ml/Magnesium


 Sulfate 2000 mg/


 Sodium Chloride  1,014.2 ml


  @ 75 mls/hr  Q24H


 IV


   10/15/19 23:00


 11/14/19 22:59  10/15/19 23:12


 


 


 Iohexol


  (OMNIPAQUE-300


 100ml)  100 ml  NOW  PRN


 INJ


 Radiology Procedure  10/15/19 13:30


 10/17/19 13:17   


 


 


 Lorazepam


  (Ativan 2mg/ml


 1ml)  2 mg  Q2H  PRN


 IV


 For Seizures  10/15/19 21:15


 10/22/19 21:14   


 


 


 Potassium Chloride  100 ml @ 


 100 mls/hr  Q1HR


 IVPB


   10/16/19 09:00


 10/16/19 10:59  10/16/19 09:55


 


 


 Thiamine HCl 100


 mg/Dextrose  56 ml @ 


 112 mls/hr  Q24H


 IVPB


   10/15/19 23:00


 11/14/19 22:59  10/15/19 22:29


 











Assessment/Plan


Assessment/Plan:


Hematology Consultation





YUE LI: MC Bailey


RFC: Thrombocytopenia, Splenomegaly eval


DOS: 10/16/19





ID


44-year-old male with a history of type 2 diabetes and hypertension currently 

not controlled as patient does not take his medication on daily basis, brought 

in by paramedics after being on the street and complaining of severe low back 

pain as well as bilateral leg numbness.  Patient reports that he has been 

feeling that his legs are giving up on him and feeling numbness for several 

months.  Patient supposed to be taking metformin for an unknown blood pressure 

medication who reports noncompliant with.  Also reports that he drinks alcohol 

on a daily basis.  Patient denies any fall or injury, denies urinary frequency 

and pain however reports of blood in his urine.  Denies flank pain, saddle 

paresthesia, tingling and numbness radiating from lower back to the legs.  

Patient rating the pain in his lumbar region and 7 out of 10 and left-sided 

ribs 5 out of 10 without shortness of breath.  Denies chest pain, blurry vision

, headache, dizziness, abdominal pain, nausea vomiting.  Has not taken 

medication to alleviate his symptoms.  Patient is stable and has stable vital 

signs.  As of trauma noted.  CT a/p was reviewed and shows splenomegaly and 

cirrhosis and heme was consulted for further eval, given plt count has dropped 

significantly.





Allergies:  


     No Known Allergies (Unverified , 10/15/19)





Past Medical History:  see triage record


Past Surgical History:  unable to obtain


Pertinent Family History:  none


Social History:  Reports: alcohol use - daily


Immunizations:  UTD


Reviewed Nursing Documentation:  PMH: Agreed; PSxH: Agreed (Maximilian Nolan)





Nursing Documentation-PMH


Hx Cardiac Problems:  No - anemia


Hx Hypertension:  Yes


Hx Diabetes:  Yes





Review of Systems negative except mentioned in HPI





PE:


Vitals: reviewed


General Appearance:  NAD


HEENT:  normocephalic, atraumatic


Neck:  non-tender, normal alignment


Respiratory/Chest:  nromal breath sounds bilaterally


Cardiovascular/Chest:  normal peripheral pulses, normal rate


Abdomen:  normal bowel sounds, soft, nontender


Extremities:  normal range of motion





Labs: noted





Imaging: reviewed





Assessment and Recs:


# Thrombocytopenia - due to etoh abise, is in this case very likely due to 

underlying cirrhosis of the liver, portal htn, splenomegaly, with microcytic 

anemia


--> have urged him to stop drinking


--> Hep panel and HIV ordered 


--> CT scan of a/p does confirm cirrhosis 


--> Peripheral smear ordered to evaluate for blasts /schistocytes shows mostly 

lymphocytes, likely due to low wbc in general


--> abx and other meds have been reviewed 


--> plt 83-->26k


--> give platelet transfusion given rapid drop, r/o DIC as well, have ordered 

workup


--> hold lovenox/heparin sq


# Leukopenia -- due to underlying liver disease, cirrhosis and portal htn/

splenomegaly


--> peripheral smear has been ordered and does not show significant 

abnormalities


--> Medications have been reviewed


--> Continue to monitor for improvement, trend cbc


--> Hep panel and HIV have been ordered


--> reverse isolation if ANC is <2000


# Pancreatitis from an elevated lipase but also findings on CT suggestive of 

cirrhosis which he says is a new diagnosis with him, portal hypertension, 

splenomegaly. 


--> has splenic sequestration as well as a granuloma in the right middle lobe. 


--> He has persistent pain and is receiving IV fluids and pain medication, 

thiamine, folic acid


# Spleen enlargement


# Type 2 diabetes 


# Hypertension currently not controlled 


# Cirrhosis due to etoh use


# DVt ppx with scds





The timing of this note does not necessarily reflect the time of the patient 

was seen.





Greatly appreciate consultation.











Kian Bryant MD Oct 16, 2019 10:51

## 2019-10-16 NOTE — NUR
Received report from RADHA Beckwith. Pt is A/Thuy4, on RA, IV site intact and running fluids, no 
apparent distress noted. Bed locked in lowest position, side rails up, call light within 
reach.

## 2019-10-16 NOTE — CONSULTATION
Consult Note


Consult Note





asked to eval at the request of dr salcedo





44-year-old male with a history of type 2 diabetes and hypertension currently 

not controlled as patient does not take his medication on daily basis, brought 

in by paramedics after being on the street and complaining of severe low back 

pain as well as bilateral leg numbness.  Patient reports that he has been 

feeling that his legs are giving up on him and feeling numbness for several 

months.  Patient supposed to be taking metformin for an unknown blood pressure 

medication who reports noncompliant with.  Also reports that he drinks alcohol 

on a daily basis.  Patient denies any fall or injury, denies urinary frequency 

and pain however reports of blood in his urine.  Denies flank pain, saddle 

paresthesia, tingling and numbness radiating from lower back to the legs.  

Patient rating the pain in his lumbar region and 7 out of 10 and left-sided 

ribs 5 out of 10 without shortness of breath.  Denies chest pain, blurry vision

, headache, dizziness, abdominal pain, nausea vomiting.  Has not taken 

medication to alleviate his symptoms.  Patient is stable and has stable vital 

signs.  As of trauma noted.





     No Known Allergies (Unverified , 10/15/19)








Hx Cardiac Problems:  No - anemia


Hx Hypertension:  Yes


Hx Diabetes:  Yes





interviewed


examined


data reviewed


Assessment/Plan





Pancreatitis


Anemia


HTN


Cirrhosis








Anemia landeros


PRN BP meds


Per orders











Manuel Skelton MD Oct 16, 2019 16:03

## 2019-10-16 NOTE — HISTORY AND PHYSICAL REPORT
DATE OF ADMISSION:  10/15/2019

HISTORY OF PRESENT ILLNESS:  The patient comes in with low back pain,

abdominal pain for couple of days.  Associated with nausea.  No vomiting.

No rectal bleeding.  The patient has uncontrolled hypertension, type 2

diabetes.  Has alcohol abuse.  Also has chronic back pain, abdominal pain,

and was admitted also for pancreatitis, also thrombocytopenia, and anemia

most likely due to alcohol.  Did not show any necrotizing pancreatitis,

but did show liver cirrhosis with portal hypertension and _______ from the

right mid lung.  The patient admitted for those reasons.



PAST MEDICAL HISTORY:  Significant for cirrhosis of the liver, alcohol

abuse, history of pancreatitis, hypertension, and NIDDM.



SOCIAL HISTORY:  History of smoking, history of drug, and alcohol abuse.



ALLERGIES:  No known allergies.



MEDICATIONS:  Metformin.



FAMILY HISTORY:  Does have history of diabetes and hypertension.



SOCIAL HISTORY:  As mentioned.



REVIEW OF SYSTEMS:  HEENT:  Denies headaches.  RESPIRATORY:  Denies

shortness of breath.  Denies cough.  CARDIOVASCULAR:  Denies chest pain.

Denies orthopnea.  GASTROINTESTINAL:  Reports abdominal pain.  No

constipation.  Some nausea.  No vomiting.  EXTREMITIES:  Does have chronic

pain syndrome.  CENTRAL NERVOUS SYSTEM:  Denies any change in vision or

speech pattern.  Feels weak.



PHYSICAL EXAMINATION:

VITAL SIGNS:  Temperature is 98.1, pulse is 73, blood pressure 144/81.

HEENT:  PERRLA.

NECK:  Supple.  No lymphadenopathy.

CHEST:  Clear to auscultation.

CARDIOVASCULAR:  Regular rate and rhythm.  No murmurs or extra sounds.

GASTROINTESTINAL:  Right upper quadrant tenderness.  No rebound.  No

organomegaly.

EXTREMITIES:  1+ edema.  Reflexes equal on both sides.  Moves all four

extremities.  Has generalized weakness.



LABORATORY DATA:  WBC of 5.3, hemoglobin of 8.9, platelets of 83.  Sodium

142, potassium 3.7, BUN of 9, creatinine 0.9, glucose of 119.  Total

bilirubin 0.4, AST of 66, ALT of 63, alkaline phosphatase of 99.  Lipase

of 623.



ASSESSMENT AND PLAN:  Abdominal pain, pancreatitis due to alcohol abuse.

Admitted for those.  We will keep the patient NPO.  The patient also had

elevated blood pressure.  I have basically consulted Dr. Soto, Dr. Kian Bryant, Dr. Negro, Dr. Riley, and Dr. Skelton for DT

management as well as for management of pancreatitis as well as for

borderline hypokalemia as well as for the management of the pancytopenia,

most likely due to alcohol abuse.









  ______________________________________________

  Zenaida Araujo M.D.





DR:  NANNETTE

D:  10/16/2019 21:31

T:  10/16/2019 22:27

JOB#:  [id number]

CC:

## 2019-10-16 NOTE — NUR
*-* INSURANCE *-*



ALL AVAILABLE CLINICALS HAVE BEEN FAXED TO:



JESS[facility]

NO  ASSIGNED AT THIS TIME

PLEASE FAX THE REVIEW/CLINICAL

P- [phone]

F- [phone]...REVIEW/CLINICAL

## 2019-10-16 NOTE — NUR
CASE MANAGEMENT:REVIEW



44 YR OLD MALE PRESENTED TO ER



CC: BACK PAIN



SI: PANCREATITIS. LIVER CIRRHOSIS

98.4   101  18  125/78  95% ON RA

H/H-8.9/30.2   PLT-83   LIPASE+623



IS: 1L NS BOLUS X2

IV MORPHINE

IV ZOFRAN

CT ABD/PELVIS

XRAY RIBS AND L SPINE

**: TO MED/SURG [facility]

## 2019-10-16 NOTE — CONSULTATION
DATE OF CONSULTATION:  10/15/2019

GASTROENTEROLOGY CONSULTATION



CONSULTING PHYSICIAN:  Willie Riley M.D.



CHIEF COMPLAINT:  I was asked to see this patient by Dr. Araujo for

evaluation of cirrhosis.



HISTORY OF PRESENT ILLNESS:  The patient is a 44-year-old  man who

states he has been drinking adamantly for many years and comes into the

hospital due to back pain.  He states that he had back pain, felt weak and

dizzy, and came to the emergency room, although he feels better now.  He

denies any abdominal pain, but did complain of some rib pain.  He states

he drinks about 10 beers a day for many years.  He has diabetes, for which

he takes metformin.  He is aware of his cirrhosis and also anemia, but

does not seem to have much workup or followup regarding these issues.  He

states he did not have endoscopy or colonoscopy.  He denied any bleeding.

He does not know if he has ever been checked for hepatitis, does not

recall being told he has hepatitis.



PAST MEDICAL HISTORY:  History of non-insulin-dependent diabetes and

history of hypertension.



MEDICATIONS:  Metformin.



FAMILY HISTORY:  Noncontributory.



SOCIAL HISTORY:  The patient is single.  He has 4 children.  He drinks 10

beers a day, but does not smoke or use any drugs.



REVIEW OF SYSTEMS:  Otherwise negative.



PHYSICAL EXAMINATION:

GENERAL:  Well-developed, well-nourished  man, seen in his room.

HEENT:  Normocephalic and atraumatic.  Sclerae are anicteric.  Oropharynx

clear.

NECK:  Supple.

CHEST:  Revealed coarse breath sounds.

CARDIOVASCULAR:  Revealed a regular rate.

ABDOMEN:  Soft, nontender.  Good bowel sounds.  There is no organomegaly.

EXTREMITIES:  Revealed no edema.

NEUROLOGIC:  Nonfocal.



LABORATORY DATA:  Noted and they showed mild elevation in AST and normal

ALT with normal alkaline phosphatase and bilirubin.  Albumin was mildly

low at 3.2.  Lipase is mildly elevated at 623 (based on this hospital's

normal standards for lipase).  CBC showed anemia with hematocrit of 30 and

MCV of 78 and platelet count of 82,000.



CT scan was noted, it was notable for some nodular changes in the liver

consistent with cirrhosis and portal hypertension.



ASSESSMENT:  This patient has longstanding drinking with apparent portal

hypertension and cirrhosis manifesting as thrombocytopenia and some mild

level of alcoholic hepatitis as manifested by liver tests.  However, the

lipase is not very elevated according to this hospital's normal standards

for lipase and also the patient denies any abdominal pain.  He says the

back pain is old, but he has always had back pain not the type that will be

referred from the front to the back.  He has already been given po diet,

which he has tolerated well.  I would continue his diabetic diet.  He was

strongly advised to discontinue drinking as this appears to be the root of

this problem.  Since he is anemic, he should undergo an endoscopy and

colonoscopy at a later date as an outpatient once he is stabilized and has

stopped drinking.  I explained to the patient that his alcoholism and

cirrhosis could ultimately be lethal causative factors if he does not stop

drinking.



RECOMMENDATIONS:

1. Intravenous hydration.

2. Restart oral diet trial.

3. Follow laboratory parameters and exam.

4. Check hepatitis serologies.

5. Strongly advised to stop drinking.

6. Outpatient endoscopy and colonoscopy.



Thank you for asking me to participate in the care of this patient.









  ______________________________________________

  Willie Riley M.D.





DR:  Avelina

D:  10/15/2019 20:27

T:  10/16/2019 00:34

JOB#:  [id number]

CC:



SALVADOR

## 2019-10-17 VITALS — DIASTOLIC BLOOD PRESSURE: 93 MMHG | SYSTOLIC BLOOD PRESSURE: 138 MMHG

## 2019-10-17 VITALS — SYSTOLIC BLOOD PRESSURE: 125 MMHG | DIASTOLIC BLOOD PRESSURE: 81 MMHG

## 2019-10-17 VITALS — DIASTOLIC BLOOD PRESSURE: 79 MMHG | SYSTOLIC BLOOD PRESSURE: 143 MMHG

## 2019-10-17 VITALS — SYSTOLIC BLOOD PRESSURE: 144 MMHG | DIASTOLIC BLOOD PRESSURE: 87 MMHG

## 2019-10-17 VITALS — DIASTOLIC BLOOD PRESSURE: 85 MMHG | SYSTOLIC BLOOD PRESSURE: 142 MMHG

## 2019-10-17 VITALS — SYSTOLIC BLOOD PRESSURE: 124 MMHG | DIASTOLIC BLOOD PRESSURE: 76 MMHG

## 2019-10-17 LAB
% IRON SATURATION: 8 % (ref 15–50)
ADD MANUAL DIFF: YES
ALBUMIN SERPL-MCNC: 3.1 G/DL (ref 3.4–5)
ALBUMIN/GLOB SERPL: 0.6 {RATIO} (ref 1–2.7)
ALP SERPL-CCNC: 102 U/L (ref 46–116)
ALT SERPL-CCNC: 42 U/L (ref 12–78)
ANION GAP SERPL CALC-SCNC: 7 MMOL/L (ref 5–15)
AST SERPL-CCNC: 44 U/L (ref 15–37)
BILIRUB DIRECT SERPL-MCNC: 0.3 MG/DL (ref 0–0.3)
BILIRUB SERPL-MCNC: 1.1 MG/DL (ref 0.2–1)
BUN SERPL-MCNC: 7 MG/DL (ref 7–18)
CALCIUM SERPL-MCNC: 8.8 MG/DL (ref 8.5–10.1)
CHLORIDE SERPL-SCNC: 102 MMOL/L (ref 98–107)
CO2 SERPL-SCNC: 28 MMOL/L (ref 21–32)
CREAT SERPL-MCNC: 0.8 MG/DL (ref 0.55–1.3)
ERYTHROCYTE [DISTWIDTH] IN BLOOD BY AUTOMATED COUNT: 18.6 % (ref 11.6–14.8)
FERRITIN SERPL-MCNC: 13 NG/ML (ref 8–388)
GAMMA GLUTAMYL TRANSPEPTIDASE: 852 U/L (ref 5–85)
GLOBULIN SER-MCNC: 5.5 G/DL
HCT VFR BLD CALC: 29.4 % (ref 42–52)
HGB BLD-MCNC: 8.8 G/DL (ref 14.2–18)
IRON SERPL-MCNC: 29 UG/DL (ref 50–175)
MCV RBC AUTO: 76 FL (ref 80–99)
PHOSPHATE SERPL-MCNC: 2.9 MG/DL (ref 2.5–4.9)
PLATELET # BLD: 35 K/UL (ref 150–450)
POTASSIUM SERPL-SCNC: 3.5 MMOL/L (ref 3.5–5.1)
RBC # BLD AUTO: 3.85 M/UL (ref 4.7–6.1)
SODIUM SERPL-SCNC: 137 MMOL/L (ref 136–145)
TIBC SERPL-MCNC: 380 UG/DL (ref 250–450)
UNSATURATED IRON BINDING: 351 UG/DL (ref 112–346)
WBC # BLD AUTO: 3.1 K/UL (ref 4.8–10.8)

## 2019-10-17 RX ADMIN — Medication SCH MG: at 16:07

## 2019-10-17 RX ADMIN — DEXTROSE MONOHYDRATE, SODIUM CHLORIDE, AND POTASSIUM CHLORIDE SCH MLS/HR: 50; 9; 2.98 INJECTION, SOLUTION INTRAVENOUS at 06:20

## 2019-10-17 NOTE — NUR
NURSE NOTES:

Patient in bed, awake, alert and verbally responsive. Able to make needs known. No complaint 
of pain or discomfort ntoed. Skin is warm and dry to touch. Respiration is even and 
unlabored. Iv site noted. Iv fluid is infusing as ordered. Bed in low and locked position. 
Call light is at bedside. Will continue plan of care.

## 2019-10-17 NOTE — GENERAL PROGRESS NOTE
Assessment/Plan


Assessment/Plan:


Assessment


- alcohol abuse


- alcoholic liver disease


- alcoholic pancreatitis


- Anemia / thrombocytopenia


- Iron deficiency





Recommendations


- d/c EtOH


- follow labs


- po solids 


- IV Iron


- Patient understands he should undergo outpatient EGD/colonoscopy given anemia





Subjective


Allergies:  


Coded Allergies:  


     No Known Allergies (Unverified , 10/15/19)


Subjective


Better


tolerating PO


no abd pain





Objective





Last 24 Hour Vital Signs








  Date Time  Temp Pulse Resp B/P (MAP) Pulse Ox O2 Delivery O2 Flow Rate FiO2


 


10/17/19 08:00 98.4 68 17 142/85 (104) 97   


 


10/17/19 08:00      Room Air  


 


10/17/19 04:00 98.4 68 17 124/76 (92) 97   


 


10/17/19 00:00 98.2 62 17 125/81 (96) 97   


 


10/16/19 21:00      Room Air  


 


10/16/19 20:00 98.4 70 16 127/76 (93) 97   


 


10/16/19 16:00 98.5 71 18 122/90 (101) 97   


 


10/16/19 12:00 98.4 80 20 135/80 (98) 97   

















Intake and Output  


 


 10/16/19 10/17/19





 18:59 06:59


 


Intake Total 1359 ml 1372 ml


 


Balance 1359 ml 1372 ml


 


  


 


Intake Oral 300 ml 360 ml


 


IV Total 725 ml 1012 ml


 


Blood Product 334 ml 


 


# Voids  3








Laboratory Tests


10/16/19 19:10: 


Urine Opiates Screen Negative, Urine Barbiturates Screen Negative, 

Phencyclidine (PCP) Screen Negative, Urine Amphetamines Screen Negative, Urine 

Benzodiazepines Screen Negative, Urine Cocaine Screen Negative, Urine Marijuana 

(THC) Screen Negative


10/17/19 06:10: 


White Blood Count 3.1L, Red Blood Count 3.85L, Hemoglobin 8.8L, Hematocrit 29.4L

, Mean Corpuscular Volume 76L, Mean Corpuscular Hemoglobin 22.9L, Mean 

Corpuscular Hemoglobin Concent 30.0L, Red Cell Distribution Width 18.6H, 

Platelet Count 35L, Mean Platelet Volume 4.6L, Neutrophils (%) (Auto) , 

Lymphocytes (%) (Auto) , Monocytes (%) (Auto) , Eosinophils (%) (Auto) , 

Basophils (%) (Auto) , Differential Total Cells Counted 100, Neutrophils % (

Manual) 52, Lymphocytes % (Manual) 34, Monocytes % (Manual) 12H, Eosinophils % (

Manual) 2, Basophils % (Manual) 0, Band Neutrophils 0, Platelet Estimate 

DecreasedL, Platelet Morphology , Clumped Platelets 1+, Hypochromasia 1+, 

Anisocytosis 1+, Microcytosis 1+, Sodium Level 137, Potassium Level 3.5, 

Chloride Level 102, Carbon Dioxide Level 28, Anion Gap 7, Blood Urea Nitrogen 7

, Creatinine 0.8, Estimat Glomerular Filtration Rate > 60, Glucose Level 115H, 

Hemoglobin A1c 6.0, Uric Acid 4.6, Calcium Level 8.8, Phosphorus Level 2.9, 

Magnesium Level 2.1, Iron Level 29L, Total Iron Binding Capacity 380, Percent 

Iron Saturation 8L, Unsaturated Iron Binding 351H, Ferritin 13, Total Bilirubin 

1.1H, Direct Bilirubin 0.3, Gamma Glutamyl Transpeptidase 852H, Aspartate Amino 

Transf (AST/SGOT) 44H, Alanine Aminotransferase (ALT/SGPT) 42, Alkaline 

Phosphatase 102, Ammonia 25, C-Reactive Protein, Quantitative < 0.4, Pro-B-Type 

Natriuretic Peptide 96, Total Protein 8.6H, Albumin 3.1L, Globulin 5.5, Albumin/

Globulin Ratio 0.6L, Vitamin B12 Level 1301H, Folate 25.1


Height (Feet):  5


Height (Inches):  10.00


Weight (Pounds):  169


Objective


WDWN


NCAT


supple


CTA


RR


Abd soft NT ND


no edema


non focal











Willie Riley MD Oct 17, 2019 10:44

## 2019-10-17 NOTE — GENERAL PROGRESS NOTE
Assessment/Plan


Problem List:  


(1) Liver cirrhosis


ICD Codes:  K74.60 - Unspecified cirrhosis of liver


SNOMED:  03013392


(2) Pancreatitis


ICD Codes:  K85.90 - Acute pancreatitis without necrosis or infection, 

unspecified


SNOMED:  84815224


(3) Spleen enlargement


ICD Codes:  R16.1 - Splenomegaly, not elsewhere classified


SNOMED:  34240899


Status:  progressing


Assessment/Plan:


etoh cirrhosis


splenomegaly


pancreatitis


abdominal pain 


reviewed chart and labs


afebrile





Subjective


ROS Limited/Unobtainable:  Yes


Allergies:  


Coded Allergies:  


     No Known Allergies (Unverified , 10/15/19)





Objective





Last 24 Hour Vital Signs








  Date Time  Temp Pulse Resp B/P (MAP) Pulse Ox O2 Delivery O2 Flow Rate FiO2


 


10/17/19 11:57 98.2 61 17 144/87 (106) 99   


 


10/17/19 08:00 98.4 68 17 142/85 (104) 97   


 


10/17/19 08:00      Room Air  


 


10/17/19 04:00 98.4 68 17 124/76 (92) 97   


 


10/17/19 00:00 98.2 62 17 125/81 (96) 97   


 


10/16/19 21:00      Room Air  


 


10/16/19 20:00 98.4 70 16 127/76 (93) 97   


 


10/16/19 16:00 98.5 71 18 122/90 (101) 97   

















Intake and Output  


 


 10/16/19 10/17/19





 18:59 06:59


 


Intake Total 1359 ml 1372 ml


 


Balance 1359 ml 1372 ml


 


  


 


Intake Oral 300 ml 360 ml


 


IV Total 725 ml 1012 ml


 


Blood Product 334 ml 


 


# Voids  3








Laboratory Tests


10/16/19 19:10: 


Urine Opiates Screen Negative, Urine Barbiturates Screen Negative, 

Phencyclidine (PCP) Screen Negative, Urine Amphetamines Screen Negative, Urine 

Benzodiazepines Screen Negative, Urine Cocaine Screen Negative, Urine Marijuana 

(THC) Screen Negative


10/17/19 06:10: 


White Blood Count 3.1L, Red Blood Count 3.85L, Hemoglobin 8.8L, Hematocrit 29.4L

, Mean Corpuscular Volume 76L, Mean Corpuscular Hemoglobin 22.9L, Mean 

Corpuscular Hemoglobin Concent 30.0L, Red Cell Distribution Width 18.6H, 

Platelet Count 35L, Mean Platelet Volume 4.6L, Neutrophils (%) (Auto) , 

Lymphocytes (%) (Auto) , Monocytes (%) (Auto) , Eosinophils (%) (Auto) , 

Basophils (%) (Auto) , Differential Total Cells Counted 100, Neutrophils % (

Manual) 52, Lymphocytes % (Manual) 34, Monocytes % (Manual) 12H, Eosinophils % (

Manual) 2, Basophils % (Manual) 0, Band Neutrophils 0, Platelet Estimate 

DecreasedL, Platelet Morphology , Clumped Platelets 1+, Hypochromasia 1+, 

Anisocytosis 1+, Microcytosis 1+, Sodium Level 137, Potassium Level 3.5, 

Chloride Level 102, Carbon Dioxide Level 28, Anion Gap 7, Blood Urea Nitrogen 7

, Creatinine 0.8, Estimat Glomerular Filtration Rate > 60, Glucose Level 115H, 

Hemoglobin A1c 6.0, Uric Acid 4.6, Calcium Level 8.8, Phosphorus Level 2.9, 

Magnesium Level 2.1, Iron Level 29L, Total Iron Binding Capacity 380, Percent 

Iron Saturation 8L, Unsaturated Iron Binding 351H, Ferritin 13, Total Bilirubin 

1.1H, Direct Bilirubin 0.3, Gamma Glutamyl Transpeptidase 852H, Aspartate Amino 

Transf (AST/SGOT) 44H, Alanine Aminotransferase (ALT/SGPT) 42, Alkaline 

Phosphatase 102, Ammonia 25, C-Reactive Protein, Quantitative < 0.4, Pro-B-Type 

Natriuretic Peptide 96, Total Protein 8.6H, Albumin 3.1L, Globulin 5.5, Albumin/

Globulin Ratio 0.6L, Vitamin B12 Level 1301H, Folate 25.1


Height (Feet):  5


Height (Inches):  10.00


Weight (Pounds):  169


Neck:  supple


Cardiovascular:  normal rate


Respiratory/Chest:  lungs clear


Abdomen:  soft











Zenaida Aruajo MD Oct 17, 2019 12:10

## 2019-10-17 NOTE — NEPHROLOGY PROGRESS NOTE
Assessment/Plan


Problem List:  


(1) Pancreatitis


(2) Liver cirrhosis


(3) HTN (hypertension)


Assessment





Pancreatitis


Anemia


HTN


Cirrhosis


Plan


Anemia landeros, low Iron


IV venofer


PRN BP meds


Per orders





Subjective


ROS Limited/Unobtainable:  No


Constitutional:  Reports: malaise





Objective


Objective





Last 24 Hour Vital Signs








  Date Time  Temp Pulse Resp B/P (MAP) Pulse Ox O2 Delivery O2 Flow Rate FiO2


 


10/17/19 11:57 98.2 61 17 144/87 (106) 99   


 


10/17/19 08:00 98.4 68 17 142/85 (104) 97   


 


10/17/19 08:00      Room Air  


 


10/17/19 04:00 98.4 68 17 124/76 (92) 97   


 


10/17/19 00:00 98.2 62 17 125/81 (96) 97   


 


10/16/19 21:00      Room Air  


 


10/16/19 20:00 98.4 70 16 127/76 (93) 97   


 


10/16/19 16:00 98.5 71 18 122/90 (101) 97   

















Intake and Output  


 


 10/16/19 10/17/19





 18:59 06:59


 


Intake Total 1359 ml 1372 ml


 


Balance 1359 ml 1372 ml


 


  


 


Intake Oral 300 ml 360 ml


 


IV Total 725 ml 1012 ml


 


Blood Product 334 ml 


 


# Voids  3








Laboratory Tests


10/16/19 19:10: 


Urine Opiates Screen Negative, Urine Barbiturates Screen Negative, 

Phencyclidine (PCP) Screen Negative, Urine Amphetamines Screen Negative, Urine 

Benzodiazepines Screen Negative, Urine Cocaine Screen Negative, Urine Marijuana 

(THC) Screen Negative


10/17/19 06:10: 


White Blood Count 3.1L, Red Blood Count 3.85L, Hemoglobin 8.8L, Hematocrit 29.4L

, Mean Corpuscular Volume 76L, Mean Corpuscular Hemoglobin 22.9L, Mean 

Corpuscular Hemoglobin Concent 30.0L, Red Cell Distribution Width 18.6H, 

Platelet Count 35L, Mean Platelet Volume 4.6L, Neutrophils (%) (Auto) , 

Lymphocytes (%) (Auto) , Monocytes (%) (Auto) , Eosinophils (%) (Auto) , 

Basophils (%) (Auto) , Differential Total Cells Counted 100, Neutrophils % (

Manual) 52, Lymphocytes % (Manual) 34, Monocytes % (Manual) 12H, Eosinophils % (

Manual) 2, Basophils % (Manual) 0, Band Neutrophils 0, Platelet Estimate 

DecreasedL, Platelet Morphology , Clumped Platelets 1+, Hypochromasia 1+, 

Anisocytosis 1+, Microcytosis 1+, Sodium Level 137, Potassium Level 3.5, 

Chloride Level 102, Carbon Dioxide Level 28, Anion Gap 7, Blood Urea Nitrogen 7

, Creatinine 0.8, Estimat Glomerular Filtration Rate > 60, Glucose Level 115H, 

Hemoglobin A1c 6.0, Uric Acid 4.6, Calcium Level 8.8, Phosphorus Level 2.9, 

Magnesium Level 2.1, Iron Level 29L, Total Iron Binding Capacity 380, Percent 

Iron Saturation 8L, Unsaturated Iron Binding 351H, Ferritin 13, Total Bilirubin 

1.1H, Direct Bilirubin 0.3, Gamma Glutamyl Transpeptidase 852H, Aspartate Amino 

Transf (AST/SGOT) 44H, Alanine Aminotransferase (ALT/SGPT) 42, Alkaline 

Phosphatase 102, Ammonia 25, C-Reactive Protein, Quantitative < 0.4, Pro-B-Type 

Natriuretic Peptide 96, Total Protein 8.6H, Albumin 3.1L, Globulin 5.5, Albumin/

Globulin Ratio 0.6L, Vitamin B12 Level 1301H, Folate 25.1


Height (Feet):  5


Height (Inches):  10.00


Weight (Pounds):  169


General Appearance:  no apparent distress


Objective


no change











Manuel Skelton MD Oct 17, 2019 15:11

## 2019-10-17 NOTE — HEMATOLOGY/ONC PROGRESS NOTE
Assessment/Plan


Assessment/Plan








Assessment and Recs:


# Thrombocytopenia - due to etoh abise, is in this case very likely due to 

underlying cirrhosis of the liver, portal htn, splenomegaly, with microcytic 

anemia


--> have urged him to stop drinking


--> Hep panel and HIV both NEG


--> CT scan of a/p does confirm cirrhosis 


--> Peripheral smear ordered to evaluate for blasts /schistocytes shows mostly 

lymphocytes, likely due to low wbc in general


--> abx and other meds have been reviewed 


--> plt 83-->26k--> 35k


--> give platelet transfusion given rapid drop, r/o DIC as well, have ordered 

workup


--> hold lovenox/heparin sq


# Leukopenia -- due to underlying liver disease, cirrhosis and portal htn/

splenomegaly


--> peripheral smear has been ordered and does not show significant 

abnormalities-->doesn't show any


--> Medications have been reviewed


--> Continue to monitor for improvement, trend cbc


--> reverse isolation if ANC is <2000


# Pancreatitis from an elevated lipase but also findings on CT suggestive of 

cirrhosis which he says is a new diagnosis with him, portal hypertension, 

splenomegaly. 


--> has splenic sequestration as well as a granuloma in the right middle lobe


--> He has persistent pain and is receiving IV fluids, pain medication, thiamine

, folic acid


# Spleen enlargement


# Type 2 diabetes 


# Hypertension currently not controlled 


# Cirrhosis due to etoh use


# DVt ppx with scds





The timing of this note does not necessarily reflect the time of the patient 

was seen.





Greatly appreciate consultation.





Subjective


Constitutional:  Denies: no symptoms, chills, fever, malaise, weakness, other


HEENT:  Denies: no symptoms, eye pain, blurred vision, tearing, double vision, 

ear pain, ear discharge, nose pain, nose congestion, throat pain, throat 

swelling, mouth pain, mouth swelling, other


Cardiovascular:  Denies: no symptoms, chest pain, edema, irregular heart rate, 

lightheadedness, palpitations, syncope, other


Respiratory:  Denies: no symptoms, cough, shortness of breath, SOB with 

excertion, SOB at rest, sputum, wheezing, other


Gastrointestinal/Abdominal:  Denies: no symptoms, abdomen distended, abdominal 

pain, black stools, tarry stools, blood in stool, constipated, diarrhea, 

difficulty swallowing, nausea, poor appetite, poor fluid intake, rectal bleeding

, vomiting, other


Genitourinary:  Denies: no symptoms, burning, discharge, frequency, flank pain, 

hematuria, incontinence, pain, urgency, other


Neurologic/Psychiatric:  Denies: no symptoms, anxiety, depressed, emotional 

problems, headache, numbness, paresthesia, pre-existing deficit, seizure, 

tingling, tremors, weakness, other


Allergies:  


Coded Allergies:  


     No Known Allergies (Unverified , 10/15/19)


Subjective


10/17: labs reviewed, no f/c, no bleeding, no night sweats





Objective


Objective





Current Medications








 Medications


  (Trade)  Dose


 Ordered  Sig/Reinaldo


 Route


 PRN Reason  Start Time


 Stop Time Status Last Admin


Dose Admin


 


 Clonidine HCl


  (Catapres Tab)  0.1 mg  Q6H  PRN


 ORAL


  SBP>170  10/15/19 21:15


 11/14/19 21:14   


 


 


 Dextrose/


 Electrolytes  1,000 ml @ 


 75 mls/hr  [id number]


 IV


   10/16/19 17:00


 11/15/19 16:59  10/16/19 19:02


 


 


 Folic Acid 1 mg/


 Multivitamins 10


 ml/Magnesium


 Sulfate 2000 mg/


 Sodium Chloride  1,014.2 ml


  @ 75 mls/hr  Q24H


 IV


   10/15/19 23:00


 11/14/19 22:59  10/16/19 23:37


 


 


 Iron Sucrose 100


 mg/Sodium Chloride  60 ml @ 


 240 mls/hr  BEDTIME


 IV


   10/17/19 21:00


 10/21/19 21:14   


 


 


 Iron Sucrose 100


 mg/Sodium Chloride  60 ml @ 


 240 mls/hr  BEDTIME


 IVPB


   10/18/19 21:00


 10/22/19 21:14 UNV  


 


 


 Iron Sucrose 200


 mg/Sodium Chloride  120 ml @ 


 240 mls/hr  ONCE  ONCE


 IVPB


   10/17/19 15:15


 10/17/19 15:44 UNV  


 


 


 Lorazepam


  (Ativan 2mg/ml


 1ml)  2 mg  Q2H  PRN


 IV


 For Seizures  10/15/19 21:15


 10/22/19 21:14   


 


 


 Thiamine HCl 100


 mg/Dextrose  56 ml @ 


 112 mls/hr  Q24H


 IVPB


   10/15/19 23:00


 11/14/19 22:59  10/16/19 23:37


 











Last 24 Hour Vital Signs








  Date Time  Temp Pulse Resp B/P (MAP) Pulse Ox O2 Delivery O2 Flow Rate FiO2


 


10/17/19 11:57 98.2 61 17 144/87 (106) 99   


 


10/17/19 08:00 98.4 68 17 142/85 (104) 97   


 


10/17/19 08:00      Room Air  


 


10/17/19 04:00 98.4 68 17 124/76 (92) 97   


 


10/17/19 00:00 98.2 62 17 125/81 (96) 97   


 


10/16/19 21:00      Room Air  


 


10/16/19 20:00 98.4 70 16 127/76 (93) 97   


 


10/16/19 16:00 98.5 71 18 122/90 (101) 97   


 


10/16/19 12:00 98.4 80 20 135/80 (98) 97   


 


10/16/19 09:00      Room Air  


 


10/16/19 08:00 98.6 78 18 140/77 (98) 96   


 


10/16/19 04:00 98.1 73 17 144/81 (102) 98   


 


10/16/19 00:00 98.6 90 18 121/69 (86) 95   


 


10/15/19 23:09      Room Air  


 


10/15/19 20:00 98.5 92 19 124/70 (88) 95   


 


10/15/19 18:58 98.0 98 18 131/84 95 Room Air  


 


10/15/19 18:01 98.2 95 18 128/79 95 Room Air  


 


10/15/19 16:54 97.6 98 15 135/84 95 Room Air  


 


10/15/19 16:02 98.0       


 


10/15/19 15:15 98.2 100 18 128/78 95 Room Air  

















Intake and Output  


 


 10/16/19 10/17/19





 18:59 06:59


 


Intake Total 1359 ml 1372 ml


 


Balance 1359 ml 1372 ml


 


  


 


Intake Oral 300 ml 360 ml


 


IV Total 725 ml 1012 ml


 


Blood Product 334 ml 


 


# Voids  3











Labs








Test


  10/15/19


12:30 10/16/19


05:53 10/16/19


09:00 10/16/19


19:10


 


White Blood Count


  5.3 K/UL


(4.8-10.8) 3.7 K/UL


(4.8-10.8) 


  


 


 


Red Blood Count


  3.89 M/UL


(4.70-6.10) 3.63 M/UL


(4.70-6.10) 


  


 


 


Hemoglobin


  8.9 G/DL


(14.2-18.0) 8.3 G/DL


(14.2-18.0) 


  


 


 


Hematocrit


  30.2 %


(42.0-52.0) 27.7 %


(42.0-52.0) 


  


 


 


Mean Corpuscular Volume 78 FL (80-99)  76 FL (80-99)   


 


Mean Corpuscular Hemoglobin


  22.9 PG


(27.0-31.0) 23.0 PG


(27.0-31.0) 


  


 


 


Mean Corpuscular Hemoglobin


Concent 29.5 G/DL


(32.0-36.0) 30.1 G/DL


(32.0-36.0) 


  


 


 


Red Cell Distribution Width


  19.1 %


(11.6-14.8) 19.0 %


(11.6-14.8) 


  


 


 


Platelet Count


  83 K/UL


(150-450) 26 K/UL


(150-450) 


  


 


 


Mean Platelet Volume


  8.9 FL


(6.5-10.1) 8.5 FL


(6.5-10.1) 


  


 


 


Neutrophils (%) (Auto)  % (45.0-75.0)   % (45.0-75.0)   


 


Lymphocytes (%) (Auto)  % (20.0-45.0)   % (20.0-45.0)   


 


Monocytes (%) (Auto)  % (1.0-10.0)   % (1.0-10.0)   


 


Eosinophils (%) (Auto)  % (0.0-3.0)   % (0.0-3.0)   


 


Basophils (%) (Auto)  % (0.0-2.0)   % (0.0-2.0)   


 


Differential Total Cells


Counted 100 


  100 


  


  


 


 


Neutrophils % (Manual) 45 % (45-75)  54 % (45-75)   


 


Lymphocytes % (Manual) 48 % (20-45)  34 % (20-45)   


 


Monocytes % (Manual) 4 % (1-10)  10 % (1-10)   


 


Eosinophils % (Manual) 3 % (0-3)  2 % (0-3)   


 


Basophils % (Manual) 0 % (0-2)  0 % (0-2)   


 


Band Neutrophils 0 % (0-8)  0 % (0-8)   


 


Platelet Estimate Decreased  Decreased   


 


Platelet Morphology   Normal   


 


Clumped Platelets 1+    


 


Hypochromasia 1+  2+   


 


Anisocytosis 1+  2+   


 


Microcytosis 1+  1+   


 


Prothrombin Time


  11.2 SEC


(9.30-11.50) 


  11.6 SEC


(9.30-11.50) 


 


 


Prothromb Time International


Ratio 1.1 (0.9-1.1) 


  


  1.1 (0.9-1.1) 


  


 


 


Activated Partial


Thromboplast Time 28 SEC (23-33) 


  


  


  


 


 


Urine Color Pale yellow    


 


Urine Appearance Clear    


 


Urine pH 5 (4.5-8.0)    


 


Urine Specific Gravity


  1.015


(1.005-1.035) 


  


  


 


 


Urine Protein 2+ (NEGATIVE)    


 


Urine Glucose (UA)


  Negative


(NEGATIVE) 


  


  


 


 


Urine Ketones


  Negative


(NEGATIVE) 


  


  


 


 


Urine Blood 1+ (NEGATIVE)    


 


Urine Nitrite


  Negative


(NEGATIVE) 


  


  


 


 


Urine Bilirubin


  Negative


(NEGATIVE) 


  


  


 


 


Urine Urobilinogen


  Normal MG/DL


(0.0-1.0) 


  


  


 


 


Urine Leukocyte Esterase


  Negative


(NEGATIVE) 


  


  


 


 


Urine RBC


  0-2 /HPF (0 -


0) 


  


  


 


 


Urine WBC 0 /HPF (0 - 0)    


 


Urine Squamous Epithelial


Cells Occasional


/LPF 


  


  


 


 


Urine Bacteria


  Occasional


/HPF (NONE) 


  


  


 


 


Sodium Level


  142 MMOL/L


(136-145) 138 MMOL/L


(136-145) 


  


 


 


Potassium Level


  3.7 MMOL/L


(3.5-5.1) 3.3 MMOL/L


(3.5-5.1) 


  


 


 


Chloride Level


  109 MMOL/L


() 104 MMOL/L


() 


  


 


 


Carbon Dioxide Level


  24 MMOL/L


(21-32) 28 MMOL/L


(21-32) 


  


 


 


Anion Gap


  9 mmol/L


(5-15) 6 mmol/L


(5-15) 


  


 


 


Blood Urea Nitrogen 9 mg/dL (7-18)  7 mg/dL (7-18)   


 


Creatinine


  0.9 MG/DL


(0.55-1.30) 0.7 MG/DL


(0.55-1.30) 


  


 


 


Estimat Glomerular Filtration


Rate > 60 mL/min


(>60) > 60 mL/min


(>60) 


  


 


 


Glucose Level


  119 MG/DL


() 86 MG/DL


() 


  


 


 


Calcium Level


  8.0 MG/DL


(8.5-10.1) 8.2 MG/DL


(8.5-10.1) 


  


 


 


Total Bilirubin


  0.4 MG/DL


(0.2-1.0) 0.6 MG/DL


(0.2-1.0) 


  


 


 


Aspartate Amino Transf


(AST/SGOT) 66 U/L (15-37) 


  54 U/L (15-37) 


  


  


 


 


Alanine Aminotransferase


(ALT/SGPT) 53 U/L (12-78) 


  45 U/L (12-78) 


  


  


 


 


Alkaline Phosphatase


  99 U/L


() 99 U/L


() 


  


 


 


Total Creatine Kinase


  141 U/L


() 


  


  


 


 


Total Protein


  8.7 G/DL


(6.4-8.2) 8.5 G/DL


(6.4-8.2) 


  


 


 


Albumin


  3.2 G/DL


(3.4-5.0) 3.1 G/DL


(3.4-5.0) 


  


 


 


Globulin 5.5 g/dL  5.4 g/dL   


 


Albumin/Globulin Ratio 0.6 (1.0-2.7)  0.7 (0.7-1.7)   


 


Lipase


  623 U/L


() 552 U/L


() 


  


 


 


Total Protein (PEP)


  


  7.4 g/dL


(6.0-8.5) 


  


 


 


Albumin (PEP)


  


  3.1 g/dL


(2.9-4.4) 


  


 


 


Globulin (PEP)


  


  4.3 g/dL


(2.2-3.9) 


  


 


 


Alpha-1-Globulins


  


  0.2 g/dL


(0.0-0.4) 


  


 


 


Alpha-2-Globulins


  


  0.6 g/dL


(0.4-1.0) 


  


 


 


Beta Globulins


  


  1.2 g/dL


(0.7-1.3) 


  


 


 


Beta Gamma Globulin


  


  2.3 g/dL


(0.4-1.8) 


  


 


 


PEP Abnormal Protein Bands


  


  Not observed


g/dL (Not 


  


 


 


Protein Electrophoresis


Interpret 


  Comment (.) 


  


  


 


 


Hepatitis A IgM Antibody


  


  Negative


(Negative) 


  


 


 


Hepatitis B Surface Antigen


  


  Negative


(Negative) 


  


 


 


Hepatitis B Core IgM Antibody


  


  Negative


(Negative) 


  


 


 


Hepatitis C Antibody


  


  0.2 s/co ratio


(0.0-0.9) 


  


 


 


HIV (1&2) Antibody Rapid


  


  Negative


(NEGATIVE) 


  


 


 


Fibrinogen


  


  


  213 mg/dL


(200-400) 


 


 


Urine Opiates Screen


  


  


  


  Negative


(NEGATIVE)


 


Urine Barbiturates Screen


  


  


  


  Negative


(NEGATIVE)


 


Phencyclidine (PCP) Screen


  


  


  


  Negative


(NEGATIVE)


 


Urine Amphetamines Screen


  


  


  


  Negative


(NEGATIVE)


 


Urine Benzodiazepines Screen


  


  


  


  Negative


(NEGATIVE)


 


Urine Cocaine Screen


  


  


  


  Negative


(NEGATIVE)


 


Urine Marijuana (THC) Screen


  


  


  


  Negative


(NEGATIVE)


 


Test


  10/17/19


06:10 


  


  


 


 


White Blood Count


  3.1 K/UL


(4.8-10.8) 


  


  


 


 


Red Blood Count


  3.85 M/UL


(4.70-6.10) 


  


  


 


 


Hemoglobin


  8.8 G/DL


(14.2-18.0) 


  


  


 


 


Hematocrit


  29.4 %


(42.0-52.0) 


  


  


 


 


Mean Corpuscular Volume 76 FL (80-99)    


 


Mean Corpuscular Hemoglobin


  22.9 PG


(27.0-31.0) 


  


  


 


 


Mean Corpuscular Hemoglobin


Concent 30.0 G/DL


(32.0-36.0) 


  


  


 


 


Red Cell Distribution Width


  18.6 %


(11.6-14.8) 


  


  


 


 


Platelet Count


  35 K/UL


(150-450) 


  


  


 


 


Mean Platelet Volume


  4.6 FL


(6.5-10.1) 


  


  


 


 


Neutrophils (%) (Auto)  % (45.0-75.0)    


 


Lymphocytes (%) (Auto)  % (20.0-45.0)    


 


Monocytes (%) (Auto)  % (1.0-10.0)    


 


Eosinophils (%) (Auto)  % (0.0-3.0)    


 


Basophils (%) (Auto)  % (0.0-2.0)    


 


Differential Total Cells


Counted 100 


  


  


  


 


 


Neutrophils % (Manual) 52 % (45-75)    


 


Lymphocytes % (Manual) 34 % (20-45)    


 


Monocytes % (Manual) 12 % (1-10)    


 


Eosinophils % (Manual) 2 % (0-3)    


 


Basophils % (Manual) 0 % (0-2)    


 


Band Neutrophils 0 % (0-8)    


 


Platelet Estimate Decreased    


 


Platelet Morphology     


 


Clumped Platelets 1+    


 


Hypochromasia 1+    


 


Anisocytosis 1+    


 


Microcytosis 1+    


 


Sodium Level


  137 MMOL/L


(136-145) 


  


  


 


 


Potassium Level


  3.5 MMOL/L


(3.5-5.1) 


  


  


 


 


Chloride Level


  102 MMOL/L


() 


  


  


 


 


Carbon Dioxide Level


  28 MMOL/L


(21-32) 


  


  


 


 


Anion Gap


  7 mmol/L


(5-15) 


  


  


 


 


Blood Urea Nitrogen 7 mg/dL (7-18)    


 


Creatinine


  0.8 MG/DL


(0.55-1.30) 


  


  


 


 


Estimat Glomerular Filtration


Rate > 60 mL/min


(>60) 


  


  


 


 


Glucose Level


  115 MG/DL


() 


  


  


 


 


Hemoglobin A1c


  6.0 %


(4.3-6.0) 


  


  


 


 


Uric Acid


  4.6 MG/DL


(2.6-7.2) 


  


  


 


 


Calcium Level


  8.8 MG/DL


(8.5-10.1) 


  


  


 


 


Phosphorus Level


  2.9 MG/DL


(2.5-4.9) 


  


  


 


 


Magnesium Level


  2.1 MG/DL


(1.8-2.4) 


  


  


 


 


Iron Level


  29 ug/dL


() 


  


  


 


 


Total Iron Binding Capacity


  380 ug/dL


(250-450) 


  


  


 


 


Percent Iron Saturation 8 % (15-50)    


 


Unsaturated Iron Binding


  351 ug/dL


(112-346) 


  


  


 


 


Ferritin


  13 NG/ML


(8-388) 


  


  


 


 


Total Bilirubin


  1.1 MG/DL


(0.2-1.0) 


  


  


 


 


Direct Bilirubin


  0.3 MG/DL


(0.0-0.3) 


  


  


 


 


Gamma Glutamyl Transpeptidase 852 U/L (5-85)    


 


Aspartate Amino Transf


(AST/SGOT) 44 U/L (15-37) 


  


  


  


 


 


Alanine Aminotransferase


(ALT/SGPT) 42 U/L (12-78) 


  


  


  


 


 


Alkaline Phosphatase


  102 U/L


() 


  


  


 


 


Ammonia


  25 umol/L


(11-32) 


  


  


 


 


C-Reactive Protein,


Quantitative < 0.4 mg/dL


(0.00-0.90) 


  


  


 


 


Pro-B-Type Natriuretic Peptide


  96 pg/mL


(0-125) 


  


  


 


 


Total Protein


  8.6 G/DL


(6.4-8.2) 


  


  


 


 


Albumin


  3.1 G/DL


(3.4-5.0) 


  


  


 


 


Globulin 5.5 g/dL    


 


Albumin/Globulin Ratio 0.6 (1.0-2.7)    


 


Vitamin B12 Level


  1301 PG/ML


(193-986) 


  


  


 


 


Folate


  25.1 NG/ML


(8.6-58.9) 


  


  


 








Height (Feet):  5


Height (Inches):  10.00


Weight (Pounds):  169











Kian Bryant MD Oct 17, 2019 15:13

## 2019-10-17 NOTE — NUR
*-* INSURANCE *-*



ALL AVAILABLE CLINICALS HAVE BEEN FAXED TO:



[facility]

NO  ASSIGNED AT THIS TIME

PLEASE FAX THE REVIEW/CLINICAL

P- [phone]

F- [phone]...REVIEW/CLINICAL

## 2019-10-17 NOTE — NUR
CASE MANAGEMENT:REVIEW



10/17/19

SI: ALCOHOLIC PANCREATITIS. LIVER DISEASE

S/P 1 UNIT PLT

98.4  68  17 142/85  97% ON RA

H/H-8.8/29.4   PLT-35



IS: IVF@75/HR

IV BANANA BAG

IV THIAMINE Q24

**: MED/SURG STATUS 4 EAST

DCP: FROM HOME

## 2019-10-17 NOTE — NUR
nurse notes

received patient in bed, patient awake, alert, oriented x4, no sign of distress, IVf patent 
and infusing well, on fall precaution instructed and maintained, plan of care was discussed 
verbalized understanding 4P's in progress call  w/n reach

will continue to monitor



risa muñoz

## 2019-10-18 VITALS — DIASTOLIC BLOOD PRESSURE: 84 MMHG | SYSTOLIC BLOOD PRESSURE: 131 MMHG

## 2019-10-18 VITALS — SYSTOLIC BLOOD PRESSURE: 144 MMHG | DIASTOLIC BLOOD PRESSURE: 82 MMHG

## 2019-10-18 VITALS — DIASTOLIC BLOOD PRESSURE: 83 MMHG | SYSTOLIC BLOOD PRESSURE: 124 MMHG

## 2019-10-18 LAB
ADD MANUAL DIFF: YES
ERYTHROCYTE [DISTWIDTH] IN BLOOD BY AUTOMATED COUNT: 18.6 % (ref 11.6–14.8)
HCT VFR BLD CALC: 30.5 % (ref 42–52)
HGB BLD-MCNC: 9.1 G/DL (ref 14.2–18)
MCV RBC AUTO: 77 FL (ref 80–99)
PLATELET # BLD: 46 K/UL (ref 150–450)
RBC # BLD AUTO: 3.97 M/UL (ref 4.7–6.1)
WBC # BLD AUTO: 3.7 K/UL (ref 4.8–10.8)

## 2019-10-18 RX ADMIN — Medication SCH MG: at 09:01

## 2019-10-18 NOTE — NUR
NURSE NOTES:

Patient discharged. IV removed and site covered. Personal belongings inventoried and sent 
with patient. Patient needs met and patient kept comfortable at all times. Patient departed 
on foot with bus tokens to [facility] bus stop.

## 2019-10-18 NOTE — GENERAL PROGRESS NOTE
Assessment/Plan


Status:  progressing


Assessment/Plan:


Assessment


- alcohol abuse


- alcoholic liver disease


- alcoholic pancreatitis


- Anemia / thrombocytopenia


- Iron deficiency





Recommendations


- d/c EtOH


- follow labs


- po solids 


- IV Iron


- Patient understands he should undergo outpatient EGD/colonoscopy given anemia





Subjective


Allergies:  


Coded Allergies:  


     No Known Allergies (Unverified , 10/15/19)


Subjective


Better


tolerating PO


no abd pain





Objective





Last 24 Hour Vital Signs








  Date Time  Temp Pulse Resp B/P (MAP) Pulse Ox O2 Delivery O2 Flow Rate FiO2


 


10/18/19 09:00      Room Air  


 


10/18/19 08:00 98.3 76 18 124/83 (97) 97   


 


10/18/19 04:00 97.5 76 16 131/84 (100) 99   


 


10/18/19 00:00 97.6 74 16 144/82 (102) 99   


 


10/17/19 21:00      Room Air  


 


10/17/19 20:00 97.8 71 16 143/79 (100) 98   

















Intake and Output  


 


 10/17/19 10/18/19





 19:00 07:00


 


Intake Total 1645 ml 360 ml


 


Balance 1645 ml 360 ml


 


  


 


Intake Oral 900 ml 


 


IV Total 745 ml 360 ml


 


# Voids 3 








Laboratory Tests


10/18/19 06:17: 


White Blood Count 3.7L, Red Blood Count 3.97L, Hemoglobin 9.1L, Hematocrit 30.5L

, Mean Corpuscular Volume 77L, Mean Corpuscular Hemoglobin 22.8L, Mean 

Corpuscular Hemoglobin Concent 29.7L, Red Cell Distribution Width 18.6H, 

Platelet Count 46L, Mean Platelet Volume 7.2, Neutrophils (%) (Auto) , 

Lymphocytes (%) (Auto) , Monocytes (%) (Auto) , Eosinophils (%) (Auto) , 

Basophils (%) (Auto) , Differential Total Cells Counted 100, Neutrophils % (

Manual) 74, Lymphocytes % (Manual) 20, Monocytes % (Manual) 5, Eosinophils % (

Manual) 1, Basophils % (Manual) 0, Band Neutrophils 0, Platelet Estimate 

DecreasedL, Platelet Morphology , Clumped Platelets Occasional, Hypochromasia 1+

, Anisocytosis 1+, Microcytosis 1+


Height (Feet):  5


Height (Inches):  10.00


Weight (Pounds):  169


Objective


WDWN


NCAT


supple


CTA


RR


Abd soft NT ND


no edema


non focal











Khorrami,Payman MD Oct 18, 2019 18:49

## 2019-10-18 NOTE — NEPHROLOGY PROGRESS NOTE
Assessment/Plan


Problem List:  


(1) Pancreatitis


(2) Liver cirrhosis


(3) HTN (hypertension)


Assessment





Pancreatitis


Anemia


HTN


Cirrhosis


Plan


Anemia landeros, low Iron


IV venofer


PRN BP meds


Per orders





Subjective


ROS Limited/Unobtainable:  No





Objective


Objective





Last 24 Hour Vital Signs








  Date Time  Temp Pulse Resp B/P (MAP) Pulse Ox O2 Delivery O2 Flow Rate FiO2


 


10/18/19 09:00      Room Air  


 


10/18/19 08:00 98.3 76 18 124/83 (97) 97   


 


10/18/19 04:00 97.5 76 16 131/84 (100) 99   


 


10/18/19 00:00 97.6 74 16 144/82 (102) 99   


 


10/17/19 21:00      Room Air  


 


10/17/19 20:00 97.8 71 16 143/79 (100) 98   


 


10/17/19 16:00 98.4 70 17 138/93 (108) 98   


 


10/17/19 11:57 98.2 61 17 144/87 (106) 99   

















Intake and Output  


 


 10/17/19 10/18/19





 19:00 07:00


 


Intake Total 1645 ml 360 ml


 


Balance 1645 ml 360 ml


 


  


 


Intake Oral 900 ml 


 


IV Total 745 ml 360 ml


 


# Voids 3 








Laboratory Tests


10/18/19 06:17: 


White Blood Count 3.7L, Red Blood Count 3.97L, Hemoglobin 9.1L, Hematocrit 30.5L

, Mean Corpuscular Volume 77L, Mean Corpuscular Hemoglobin 22.8L, Mean 

Corpuscular Hemoglobin Concent 29.7L, Red Cell Distribution Width 18.6H, 

Platelet Count 46L, Mean Platelet Volume 7.2, Neutrophils (%) (Auto) , 

Lymphocytes (%) (Auto) , Monocytes (%) (Auto) , Eosinophils (%) (Auto) , 

Basophils (%) (Auto) , Differential Total Cells Counted 100, Neutrophils % (

Manual) 74, Lymphocytes % (Manual) 20, Monocytes % (Manual) 5, Eosinophils % (

Manual) 1, Basophils % (Manual) 0, Band Neutrophils 0, Platelet Estimate 

DecreasedL, Platelet Morphology , Clumped Platelets Occasional, Hypochromasia 1+

, Anisocytosis 1+, Microcytosis 1+


Height (Feet):  5


Height (Inches):  10.00


Weight (Pounds):  169


General Appearance:  no apparent distress


Objective


no change











Fouladian,Manuel MD Oct 18, 2019 10:47

## 2019-10-18 NOTE — NUR
*-* INSURANCE *-*



ALL AVAILABLE CLINICALS HAVE BEEN FAXED TO:



[facility]

NO  ASSIGNED AT THIS TIME

PLEASE FAX THE REVIEW/CLINICAL

P- [phone]

F- [phone]...REVIEW/CLINICAL

## 2019-10-18 NOTE — NUR
NURSE NOTES:

Received patient on bed, awake. IV intact and patent. Bed in low and locked position, call 
light in reach. No signs of respiratory distress or pain. Room board updated, will continue 
to monitor.

## 2019-10-18 NOTE — HEMATOLOGY/ONC PROGRESS NOTE
Assessment/Plan


Assessment/Plan








Assessment and Recs:


# Thrombocytopenia - due to etoh abise, is in this case very likely due to 

underlying cirrhosis of the liver, portal htn, splenomegaly, with microcytic 

anemia


--> have urged him to stop drinking


--> Hep panel and HIV both NEG


--> CT scan of a/p does confirm cirrhosis 


--> Peripheral smear ordered to evaluate for blasts /schistocytes shows mostly 

lymphocytes, likely due to low wbc in general


--> abx and other meds have been reviewed 


--> plt 83-->26k--> 35k


--> give platelet transfusion given rapid drop, r/o DIC as well


--> hold lovenox/heparin sq


# Leukopenia -- due to underlying liver disease, cirrhosis and portal htn/

splenomegaly


--> peripheral smear has been ordered and does not show significant 

abnormalities-->doesn't show any


--> Medications have been reviewed


--> Continue to monitor for improvement, trend cbc


--> reverse isolation if ANC is <2000


--> wbc 5.3-->3.7


# Pancreatitis from an elevated lipase but also findings on CT suggestive of 

cirrhosis which he says is a new diagnosis with him, portal hypertension, 

splenomegaly. 


--> has splenic sequestration as well as a granuloma in the right middle lobe


--> He has persistent pain and is receiving IV fluids, pain medication, thiamine

, folic acid


# Spleen enlargement


# Type 2 diabetes 


# Hypertension currently not controlled 


# Cirrhosis due to etoh use


# DVt ppx with scds





The timing of this note does not necessarily reflect the time of the patient 

was seen.





Greatly appreciate consultation.





Subjective


Constitutional:  Denies: no symptoms, chills, fever, malaise, weakness, other


HEENT:  Denies: no symptoms, eye pain, blurred vision, tearing, double vision, 

ear pain, ear discharge, nose pain, nose congestion, throat pain, throat 

swelling, mouth pain, mouth swelling, other


Cardiovascular:  Denies: no symptoms, chest pain, edema, irregular heart rate, 

lightheadedness, palpitations, syncope, other


Respiratory:  Denies: no symptoms, cough, shortness of breath, SOB with 

excertion, SOB at rest, sputum, wheezing, other


Gastrointestinal/Abdominal:  Denies: no symptoms, abdomen distended, abdominal 

pain, black stools, tarry stools, blood in stool, constipated, diarrhea, 

difficulty swallowing, nausea, poor appetite, poor fluid intake, rectal bleeding

, vomiting, other


Endocrine:  Denies: no symptoms, excessive sweating, flushing, intolerance to 

cold, intolerance to heat, increased hunger, increased thirst, increased urine, 

unexplained weight gain, unexplained weight loss, other


Hematologic/Lymphatic:  Denies: no symptoms, anemia, easy bleeding, easy 

bruising, adenopathy, other


Allergies:  


Coded Allergies:  


     No Known Allergies (Unverified , 10/15/19)


Subjective


10/17: labs reviewed, no f/c, no bleeding, no night sweats


10/18: no events to report, no bleeding noted, no f/c





Objective


Objective





Last 24 Hour Vital Signs








  Date Time  Temp Pulse Resp B/P (MAP) Pulse Ox O2 Delivery O2 Flow Rate FiO2


 


10/18/19 09:00      Room Air  


 


10/18/19 08:00 98.3 76 18 124/83 (97) 97   


 


10/18/19 04:00 97.5 76 16 131/84 (100) 99   


 


10/18/19 00:00 97.6 74 16 144/82 (102) 99   


 


10/17/19 21:00      Room Air  


 


10/17/19 20:00 97.8 71 16 143/79 (100) 98   


 


10/17/19 16:00 98.4 70 17 138/93 (108) 98   


 


10/17/19 11:57 98.2 61 17 144/87 (106) 99   


 


10/17/19 08:00 98.4 68 17 142/85 (104) 97   


 


10/17/19 08:00      Room Air  


 


10/17/19 04:00 98.4 68 17 124/76 (92) 97   


 


10/17/19 00:00 98.2 62 17 125/81 (96) 97   


 


10/16/19 21:00      Room Air  


 


10/16/19 20:00 98.4 70 16 127/76 (93) 97   


 


10/16/19 16:00 98.5 71 18 122/90 (101) 97   

















Intake and Output  


 


 10/17/19 10/18/19





 18:59 06:59


 


Intake Total 1680 ml 400 ml


 


Balance 1680 ml 400 ml


 


  


 


Intake Oral 900 ml 


 


IV Total 780 ml 400 ml


 


# Voids 3 











Labs








Test


  10/16/19


05:53 10/16/19


09:00 10/16/19


19:10 10/17/19


06:10


 


White Blood Count


  3.7 K/UL


(4.8-10.8) 


  


  3.1 K/UL


(4.8-10.8)


 


Red Blood Count


  3.63 M/UL


(4.70-6.10) 


  


  3.85 M/UL


(4.70-6.10)


 


Hemoglobin


  8.3 G/DL


(14.2-18.0) 


  


  8.8 G/DL


(14.2-18.0)


 


Hematocrit


  27.7 %


(42.0-52.0) 


  


  29.4 %


(42.0-52.0)


 


Mean Corpuscular Volume 76 FL (80-99)    76 FL (80-99) 


 


Mean Corpuscular Hemoglobin


  23.0 PG


(27.0-31.0) 


  


  22.9 PG


(27.0-31.0)


 


Mean Corpuscular Hemoglobin


Concent 30.1 G/DL


(32.0-36.0) 


  


  30.0 G/DL


(32.0-36.0)


 


Red Cell Distribution Width


  19.0 %


(11.6-14.8) 


  


  18.6 %


(11.6-14.8)


 


Platelet Count


  26 K/UL


(150-450) 


  


  35 K/UL


(150-450)


 


Mean Platelet Volume


  8.5 FL


(6.5-10.1) 


  


  4.6 FL


(6.5-10.1)


 


Neutrophils (%) (Auto)  % (45.0-75.0)     % (45.0-75.0) 


 


Lymphocytes (%) (Auto)  % (20.0-45.0)     % (20.0-45.0) 


 


Monocytes (%) (Auto)  % (1.0-10.0)     % (1.0-10.0) 


 


Eosinophils (%) (Auto)  % (0.0-3.0)     % (0.0-3.0) 


 


Basophils (%) (Auto)  % (0.0-2.0)     % (0.0-2.0) 


 


Differential Total Cells


Counted 100 


  


  


  100 


 


 


Neutrophils % (Manual) 54 % (45-75)    52 % (45-75) 


 


Lymphocytes % (Manual) 34 % (20-45)    34 % (20-45) 


 


Monocytes % (Manual) 10 % (1-10)    12 % (1-10) 


 


Eosinophils % (Manual) 2 % (0-3)    2 % (0-3) 


 


Basophils % (Manual) 0 % (0-2)    0 % (0-2) 


 


Band Neutrophils 0 % (0-8)    0 % (0-8) 


 


Platelet Estimate Decreased    Decreased 


 


Platelet Morphology Normal     


 


Hypochromasia 2+    1+ 


 


Anisocytosis 2+    1+ 


 


Microcytosis 1+    1+ 


 


Sodium Level


  138 MMOL/L


(136-145) 


  


  137 MMOL/L


(136-145)


 


Potassium Level


  3.3 MMOL/L


(3.5-5.1) 


  


  3.5 MMOL/L


(3.5-5.1)


 


Chloride Level


  104 MMOL/L


() 


  


  102 MMOL/L


()


 


Carbon Dioxide Level


  28 MMOL/L


(21-32) 


  


  28 MMOL/L


(21-32)


 


Anion Gap


  6 mmol/L


(5-15) 


  


  7 mmol/L


(5-15)


 


Blood Urea Nitrogen 7 mg/dL (7-18)    7 mg/dL (7-18) 


 


Creatinine


  0.7 MG/DL


(0.55-1.30) 


  


  0.8 MG/DL


(0.55-1.30)


 


Estimat Glomerular Filtration


Rate > 60 mL/min


(>60) 


  


  > 60 mL/min


(>60)


 


Glucose Level


  86 MG/DL


() 


  


  115 MG/DL


()


 


Calcium Level


  8.2 MG/DL


(8.5-10.1) 


  


  8.8 MG/DL


(8.5-10.1)


 


Total Bilirubin


  0.6 MG/DL


(0.2-1.0) 


  


  1.1 MG/DL


(0.2-1.0)


 


Aspartate Amino Transf


(AST/SGOT) 54 U/L (15-37) 


  


  


  44 U/L (15-37) 


 


 


Alanine Aminotransferase


(ALT/SGPT) 45 U/L (12-78) 


  


  


  42 U/L (12-78) 


 


 


Alkaline Phosphatase


  99 U/L


() 


  


  102 U/L


()


 


Total Protein


  8.5 G/DL


(6.4-8.2) 


  


  8.6 G/DL


(6.4-8.2)


 


Total Protein (PEP)


  7.4 g/dL


(6.0-8.5) 


  


  


 


 


Albumin


  3.1 G/DL


(3.4-5.0) 


  


  3.1 G/DL


(3.4-5.0)


 


Albumin (PEP)


  3.1 g/dL


(2.9-4.4) 


  


  


 


 


Globulin 5.4 g/dL    5.5 g/dL 


 


Globulin (PEP)


  4.3 g/dL


(2.2-3.9) 


  


  


 


 


Albumin/Globulin Ratio 0.7 (0.7-1.7)    0.6 (1.0-2.7) 


 


Alpha-1-Globulins


  0.2 g/dL


(0.0-0.4) 


  


  


 


 


Alpha-2-Globulins


  0.6 g/dL


(0.4-1.0) 


  


  


 


 


Beta Globulins


  1.2 g/dL


(0.7-1.3) 


  


  


 


 


Beta Gamma Globulin


  2.3 g/dL


(0.4-1.8) 


  


  


 


 


PEP Abnormal Protein Bands


  Not observed


g/dL (Not 


  


  


 


 


Protein Electrophoresis


Interpret Comment (.) 


  


  


  


 


 


Lipase


  552 U/L


() 


  


  


 


 


Hepatitis A IgM Antibody


  Negative


(Negative) 


  


  


 


 


Hepatitis B Surface Antigen


  Negative


(Negative) 


  


  


 


 


Hepatitis B Core IgM Antibody


  Negative


(Negative) 


  


  


 


 


Hepatitis C Antibody


  0.2 s/co ratio


(0.0-0.9) 


  


  


 


 


HIV (1&2) Antibody Rapid


  Negative


(NEGATIVE) 


  


  


 


 


Prothrombin Time


  


  11.6 SEC


(9.30-11.50) 


  


 


 


Prothromb Time International


Ratio 


  1.1 (0.9-1.1) 


  


  


 


 


Fibrinogen


  


  213 mg/dL


(200-400) 


  


 


 


Urine Opiates Screen


  


  


  Negative


(NEGATIVE) 


 


 


Urine Barbiturates Screen


  


  


  Negative


(NEGATIVE) 


 


 


Phencyclidine (PCP) Screen


  


  


  Negative


(NEGATIVE) 


 


 


Urine Amphetamines Screen


  


  


  Negative


(NEGATIVE) 


 


 


Urine Benzodiazepines Screen


  


  


  Negative


(NEGATIVE) 


 


 


Urine Cocaine Screen


  


  


  Negative


(NEGATIVE) 


 


 


Urine Marijuana (THC) Screen


  


  


  Negative


(NEGATIVE) 


 


 


Clumped Platelets    1+ 


 


Hemoglobin A1c


  


  


  


  6.0 %


(4.3-6.0)


 


Uric Acid


  


  


  


  4.6 MG/DL


(2.6-7.2)


 


Phosphorus Level


  


  


  


  2.9 MG/DL


(2.5-4.9)


 


Magnesium Level


  


  


  


  2.1 MG/DL


(1.8-2.4)


 


Iron Level


  


  


  


  29 ug/dL


()


 


Total Iron Binding Capacity


  


  


  


  380 ug/dL


(250-450)


 


Percent Iron Saturation    8 % (15-50) 


 


Unsaturated Iron Binding


  


  


  


  351 ug/dL


(112-346)


 


Ferritin


  


  


  


  13 NG/ML


(8-388)


 


Direct Bilirubin


  


  


  


  0.3 MG/DL


(0.0-0.3)


 


Gamma Glutamyl Transpeptidase    852 U/L (5-85) 


 


Ammonia


  


  


  


  25 umol/L


(11-32)


 


C-Reactive Protein,


Quantitative 


  


  


  < 0.4 mg/dL


(0.00-0.90)


 


Pro-B-Type Natriuretic Peptide


  


  


  


  96 pg/mL


(0-125)


 


Vitamin B12 Level


  


  


  


  1301 PG/ML


(193-986)


 


Folate


  


  


  


  25.1 NG/ML


(8.6-58.9)


 


Test


  10/18/19


06:17 


  


  


 


 


White Blood Count


  3.7 K/UL


(4.8-10.8) 


  


  


 


 


Red Blood Count


  3.97 M/UL


(4.70-6.10) 


  


  


 


 


Hemoglobin


  9.1 G/DL


(14.2-18.0) 


  


  


 


 


Hematocrit


  30.5 %


(42.0-52.0) 


  


  


 


 


Mean Corpuscular Volume 77 FL (80-99)    


 


Mean Corpuscular Hemoglobin


  22.8 PG


(27.0-31.0) 


  


  


 


 


Mean Corpuscular Hemoglobin


Concent 29.7 G/DL


(32.0-36.0) 


  


  


 


 


Red Cell Distribution Width


  18.6 %


(11.6-14.8) 


  


  


 


 


Platelet Count


  46 K/UL


(150-450) 


  


  


 


 


Mean Platelet Volume


  7.2 FL


(6.5-10.1) 


  


  


 


 


Neutrophils (%) (Auto)  % (45.0-75.0)    


 


Lymphocytes (%) (Auto)  % (20.0-45.0)    


 


Monocytes (%) (Auto)  % (1.0-10.0)    


 


Eosinophils (%) (Auto)  % (0.0-3.0)    


 


Basophils (%) (Auto)  % (0.0-2.0)    


 


Differential Total Cells


Counted 100 


  


  


  


 


 


Neutrophils % (Manual) 74 % (45-75)    


 


Lymphocytes % (Manual) 20 % (20-45)    


 


Monocytes % (Manual) 5 % (1-10)    


 


Eosinophils % (Manual) 1 % (0-3)    


 


Basophils % (Manual) 0 % (0-2)    


 


Band Neutrophils 0 % (0-8)    


 


Platelet Estimate Decreased    


 


Platelet Morphology     


 


Clumped Platelets Occasional    


 


Hypochromasia 1+    


 


Anisocytosis 1+    


 


Microcytosis 1+    








Height (Feet):  5


Height (Inches):  10.00


Weight (Pounds):  169











Kian Bryant MD Oct 18, 2019 14:39

## 2019-10-19 NOTE — DISCHARGE SUMMARY
Discharge Summary


Discharge Summary


_


DATE OF ADMISSION: 10/15/2019


DATE OF DISCHARGE: 10/18/2019





DISCHARGED BY: Dr. Zenaida Brink





CONSULTANTS: 


Dr. Willie Skelton





BRIEF HOSPITAL COURSE:


Patient is a 44-year-old male, who presented to ED due to low back pain and 

abdominal pain for couple of days with associated nausea but no vomiting.  

Denied rectal bleeding.  He has history of uncontrolled hypertension, type 2 

diabetes and alcohol abuse.  He is noncompliant with medications.  





On evaluation at the ED, blood work did not show any leukocytosis.  Hemoglobin 9

, hematocrit 30, platelet 83.  Electrolytes were normal.  Calcium 8.0.  AST 66, 

ALT 53.  Lipase was 623. Chest x-ray did not show any acute disease.  Lumbar 

spine x-ray showed degenerative changes with no acute bony trauma, no fracture 

or dislocation.  X-ray of the chest and left rib did not show any acute process 

with evidence of old granulomatous disease.  Calcified granuloma in the right 

mid lung.  No pneumothorax.  CT of the abdomen and pelvis with contrast showed 

evidence of hepatic cirrhosis, evidence of portal hypertension, with 

splenomegaly and small diuresis.  Prominent prostate.  Dependent posterior 

pulmonary atelectatic changes and mild degenerative spondylosis.  He was 

admitted for evaluation of abdominal pain, pancreatitis due to alcohol abuse.  





GI was consulted.  Patient with long-standing history of alcohol drinking and 

portal hypertension and cirrhosis manifesting as thrombocytopenia and elevated 

LFTs.  Lipase was elevated, however patient denies abdominal pain.  He was 

started on p.o. diet.  He was strongly advised to discontinue drinking alcohol.

  He was given IV hydration.  He was given folic acid, multivitamin and 

thiamine.  He was given IV iron for anemia.  He was recommended should undergo 

outpatient EGD and colonoscopy.





Hematology was consulted.  Patient had thrombocytopenia secondary to EtOH 

abuse.  Platelet count dropped to 26.  He was given platelet transfusion.  

Leukopenia was secondary to liver disease, cirrhosis and portal hypertension 

with splenomegaly.  Peripheral smear did not show any significant 

abnormalities.  Hepatitis panel and HIV negative.





He was tolerating diet well.  He was cleared for discharge home.





FINAL DIAGNOSES: 


Alcoholic pancreatitis


Alcohol abuse


Alcoholic liver disease


Thrombocytopenia


Iron deficiency


Leukopenia


Splenomegaly


Type 2 diabetes


Hypertension


Cirrhosis due to EtOH use





DISPOSITION: Patient was discharged home.





DISCHARGE MEDICATIONS: Refer to Discharge Medication List.





DISCHARGE INSTRUCTIONS: Follow-up in a week.








I have been assigned to complete a discharge summary on this account, I was not 

involved with the patient's management.--MELISSA Valencia Jacqueline Robles NP Oct 19, 2019 20:44

## 2019-10-21 NOTE — NUR
*-* INSURANCE *-*



DISCHARGE SUMMARY HAS HAVE BEEN FAXED TO:



[facility]

NO  ASSIGNED AT THIS TIME

PLEASE FAX THE REVIEW/CLINICAL

P- [phone]

F- [phone]...REVIEW/CLINICAL